# Patient Record
Sex: FEMALE | Race: BLACK OR AFRICAN AMERICAN | NOT HISPANIC OR LATINO
[De-identification: names, ages, dates, MRNs, and addresses within clinical notes are randomized per-mention and may not be internally consistent; named-entity substitution may affect disease eponyms.]

---

## 2021-09-02 PROBLEM — Z00.00 ENCOUNTER FOR PREVENTIVE HEALTH EXAMINATION: Status: ACTIVE | Noted: 2021-09-02

## 2021-09-21 ENCOUNTER — APPOINTMENT (OUTPATIENT)
Dept: OTOLARYNGOLOGY | Facility: CLINIC | Age: 61
End: 2021-09-21

## 2021-10-12 ENCOUNTER — APPOINTMENT (OUTPATIENT)
Dept: OTOLARYNGOLOGY | Facility: CLINIC | Age: 61
End: 2021-10-12

## 2022-01-25 ENCOUNTER — APPOINTMENT (OUTPATIENT)
Dept: OTOLARYNGOLOGY | Facility: CLINIC | Age: 62
End: 2022-01-25
Payer: COMMERCIAL

## 2022-01-25 VITALS
HEART RATE: 94 BPM | DIASTOLIC BLOOD PRESSURE: 72 MMHG | BODY MASS INDEX: 25.4 KG/M2 | WEIGHT: 138 LBS | SYSTOLIC BLOOD PRESSURE: 113 MMHG | HEIGHT: 62 IN | OXYGEN SATURATION: 96 %

## 2022-01-25 DIAGNOSIS — Z86.59 PERSONAL HISTORY OF OTHER MENTAL AND BEHAVIORAL DISORDERS: ICD-10-CM

## 2022-01-25 DIAGNOSIS — Z78.9 OTHER SPECIFIED HEALTH STATUS: ICD-10-CM

## 2022-01-25 PROCEDURE — 99243 OFF/OP CNSLTJ NEW/EST LOW 30: CPT

## 2022-01-25 RX ORDER — ESCITALOPRAM OXALATE 5 MG/1
TABLET, FILM COATED ORAL
Refills: 0 | Status: ACTIVE | COMMUNITY

## 2022-01-25 RX ORDER — TRAZODONE HYDROCHLORIDE 300 MG/1
TABLET ORAL
Refills: 0 | Status: ACTIVE | COMMUNITY

## 2022-01-25 RX ORDER — RIZATRIPTAN BENZOATE 10 MG/1
TABLET ORAL
Refills: 0 | Status: ACTIVE | COMMUNITY

## 2022-01-25 RX ORDER — LAMOTRIGINE 25 MG/1
TABLET ORAL
Refills: 0 | Status: ACTIVE | COMMUNITY

## 2022-01-25 RX ORDER — METFORMIN HYDROCHLORIDE 625 MG/1
TABLET ORAL
Refills: 0 | Status: ACTIVE | COMMUNITY

## 2022-01-25 RX ORDER — ERGOCALCIFEROL 1.25 MG/1
1.25 MG CAPSULE ORAL
Refills: 0 | Status: ACTIVE | COMMUNITY

## 2022-01-25 NOTE — HISTORY OF PRESENT ILLNESS
[None] : No associated symptoms are reported. [de-identified] : Ms. Barajas is a 60 yo female who is being referred by Dr. Boogie (Endocrinologist) for surgical evaluation on parathyroid gland. She reports during visit with Dr. Boogie for pre-diabetes calcium levels were noted to be elevated. \par 9/18/2021 .4 ViD 85.8. Elevated PTH and calcium since 2018 calcium 10.9 PTH 64. On prescription VitD not on calcium supplements \par 3/12/2021 calcium Ionized 1.43 total calcium 10.5 \par 8/30/2021 NM parathyroid scan showing abnormal parathyroid tissue at the right thyroid lobe\par 6/24/2021 sonogram \par Has not had bone density.  24 hr urine test done last years and told to have elevated calcium level in the urine. \par 2/2021 kidney stones treated with lithotripsy .  No bone pain, bone fracture, or constipation. Feels foggy and has had issues with memory, however, she also takes medications for bipolar disorder that can also make her feel foggy.  \par Denies pain, dysphagia, dysphonia and or dyspnea \par Denies history of smoking. Occasional alcohol intake. \par Denies family history with elevated calcium or PTH levels\par Received booster Covid 19 vacc on 12/28/2021\par \par no FH.  co poor memory, poor energy\par \par pt first learned of hypercalcemia several years ago.

## 2022-01-25 NOTE — CONSULT LETTER
[Consult Letter:] : I had the pleasure of evaluating your patient, [unfilled]. [Please see my note below.] : Please see my note below. [Consult Closing:] : Thank you very much for allowing me to participate in the care of this patient.  If you have any questions, please do not hesitate to contact me. [Sincerely,] : Sincerely, [FreeTextEntry2] : Holger Boogie MD ( [place], [place]) [FreeTextEntry3] : Piotr Cueto MD, FACS\par \par    [facility] [place]\par Associate Chair\par    Department of Otolaryngology\par \par Professor\par Otolaryngology & Molecular Medicine\par [employer] [facility] [employer]\par

## 2022-02-05 ENCOUNTER — APPOINTMENT (OUTPATIENT)
Dept: CT IMAGING | Facility: IMAGING CENTER | Age: 62
End: 2022-02-05

## 2022-02-05 ENCOUNTER — APPOINTMENT (OUTPATIENT)
Dept: ULTRASOUND IMAGING | Facility: IMAGING CENTER | Age: 62
End: 2022-02-05

## 2022-02-11 ENCOUNTER — APPOINTMENT (OUTPATIENT)
Dept: ULTRASOUND IMAGING | Facility: IMAGING CENTER | Age: 62
End: 2022-02-11

## 2022-02-11 ENCOUNTER — OUTPATIENT (OUTPATIENT)
Dept: OUTPATIENT SERVICES | Facility: HOSPITAL | Age: 62
LOS: 1 days | End: 2022-02-11
Payer: COMMERCIAL

## 2022-02-11 ENCOUNTER — APPOINTMENT (OUTPATIENT)
Dept: RADIOLOGY | Facility: IMAGING CENTER | Age: 62
End: 2022-02-11
Payer: COMMERCIAL

## 2022-02-11 ENCOUNTER — RESULT REVIEW (OUTPATIENT)
Age: 62
End: 2022-02-11

## 2022-02-11 ENCOUNTER — APPOINTMENT (OUTPATIENT)
Dept: CT IMAGING | Facility: IMAGING CENTER | Age: 62
End: 2022-02-11

## 2022-02-11 DIAGNOSIS — E21.0 PRIMARY HYPERPARATHYROIDISM: ICD-10-CM

## 2022-02-11 PROCEDURE — 76536 US EXAM OF HEAD AND NECK: CPT | Mod: 26

## 2022-02-11 PROCEDURE — 70492 CT SFT TSUE NCK W/O & W/DYE: CPT

## 2022-02-11 PROCEDURE — 77080 DXA BONE DENSITY AXIAL: CPT | Mod: 26

## 2022-02-11 PROCEDURE — 77080 DXA BONE DENSITY AXIAL: CPT

## 2022-02-11 PROCEDURE — 76536 US EXAM OF HEAD AND NECK: CPT

## 2022-02-11 PROCEDURE — 70491 CT SOFT TISSUE NECK W/DYE: CPT | Mod: 26

## 2022-02-15 ENCOUNTER — NON-APPOINTMENT (OUTPATIENT)
Age: 62
End: 2022-02-15

## 2022-03-22 ENCOUNTER — NON-APPOINTMENT (OUTPATIENT)
Age: 62
End: 2022-03-22

## 2022-04-05 ENCOUNTER — OUTPATIENT (OUTPATIENT)
Dept: OUTPATIENT SERVICES | Facility: HOSPITAL | Age: 62
LOS: 1 days | End: 2022-04-05
Payer: COMMERCIAL

## 2022-04-05 VITALS
HEART RATE: 88 BPM | TEMPERATURE: 99 F | DIASTOLIC BLOOD PRESSURE: 70 MMHG | OXYGEN SATURATION: 97 % | HEIGHT: 61 IN | WEIGHT: 138.01 LBS | RESPIRATION RATE: 16 BRPM | SYSTOLIC BLOOD PRESSURE: 100 MMHG

## 2022-04-05 DIAGNOSIS — Z98.890 OTHER SPECIFIED POSTPROCEDURAL STATES: Chronic | ICD-10-CM

## 2022-04-05 DIAGNOSIS — E21.0 PRIMARY HYPERPARATHYROIDISM: ICD-10-CM

## 2022-04-05 DIAGNOSIS — E11.9 TYPE 2 DIABETES MELLITUS WITHOUT COMPLICATIONS: ICD-10-CM

## 2022-04-05 DIAGNOSIS — F31.9 BIPOLAR DISORDER, UNSPECIFIED: ICD-10-CM

## 2022-04-05 LAB
A1C WITH ESTIMATED AVERAGE GLUCOSE RESULT: 5.1 % — SIGNIFICANT CHANGE UP (ref 4–5.6)
ANION GAP SERPL CALC-SCNC: 12 MMOL/L — SIGNIFICANT CHANGE UP (ref 7–14)
BUN SERPL-MCNC: 23 MG/DL — SIGNIFICANT CHANGE UP (ref 7–23)
CALCIUM SERPL-MCNC: 11.1 MG/DL — HIGH (ref 8.4–10.5)
CHLORIDE SERPL-SCNC: 106 MMOL/L — SIGNIFICANT CHANGE UP (ref 98–107)
CO2 SERPL-SCNC: 24 MMOL/L — SIGNIFICANT CHANGE UP (ref 22–31)
CREAT SERPL-MCNC: 1.1 MG/DL — SIGNIFICANT CHANGE UP (ref 0.5–1.3)
EGFR: 58 ML/MIN/1.73M2 — LOW
ESTIMATED AVERAGE GLUCOSE: 100 — SIGNIFICANT CHANGE UP
GLUCOSE SERPL-MCNC: 103 MG/DL — HIGH (ref 70–99)
HCT VFR BLD CALC: 40 % — SIGNIFICANT CHANGE UP (ref 34.5–45)
HGB BLD-MCNC: 12.8 G/DL — SIGNIFICANT CHANGE UP (ref 11.5–15.5)
MCHC RBC-ENTMCNC: 30.8 PG — SIGNIFICANT CHANGE UP (ref 27–34)
MCHC RBC-ENTMCNC: 32 GM/DL — SIGNIFICANT CHANGE UP (ref 32–36)
MCV RBC AUTO: 96.2 FL — SIGNIFICANT CHANGE UP (ref 80–100)
NRBC # BLD: 0 /100 WBCS — SIGNIFICANT CHANGE UP
NRBC # FLD: 0 K/UL — SIGNIFICANT CHANGE UP
PLATELET # BLD AUTO: 355 K/UL — SIGNIFICANT CHANGE UP (ref 150–400)
POTASSIUM SERPL-MCNC: 4.5 MMOL/L — SIGNIFICANT CHANGE UP (ref 3.5–5.3)
POTASSIUM SERPL-SCNC: 4.5 MMOL/L — SIGNIFICANT CHANGE UP (ref 3.5–5.3)
RBC # BLD: 4.16 M/UL — SIGNIFICANT CHANGE UP (ref 3.8–5.2)
RBC # FLD: 13.7 % — SIGNIFICANT CHANGE UP (ref 10.3–14.5)
SODIUM SERPL-SCNC: 142 MMOL/L — SIGNIFICANT CHANGE UP (ref 135–145)
WBC # BLD: 7.01 K/UL — SIGNIFICANT CHANGE UP (ref 3.8–10.5)
WBC # FLD AUTO: 7.01 K/UL — SIGNIFICANT CHANGE UP (ref 3.8–10.5)

## 2022-04-05 PROCEDURE — 93010 ELECTROCARDIOGRAM REPORT: CPT

## 2022-04-05 NOTE — H&P PST ADULT - HISTORY OF PRESENT ILLNESS
61 year old female with PMH of DM2, Kidney stones, Hypercalcemia, Bipolar disorder, presents to PST, with pre op diagnosis of Primary Hyperparathyroidism for pre op evaluation prior to scheduled surgery- Parathyroidectomy with Parathyroid hormone assay with Dr Cueto.

## 2022-04-05 NOTE — H&P PST ADULT - NEGATIVE ENMT SYMPTOMS
no hearing difficulty/no ear pain/no tinnitus/no vertigo/no sinus symptoms/no nose bleeds/no recurrent cold sores

## 2022-04-05 NOTE — H&P PST ADULT - PROBLEM SELECTOR PLAN 1
Pt is tentatively scheduled for Parathyroidectomy with Parathyroid hormone assay with Dr Cueto on 04/15/22.    Pre-op instructions provided. Pt given verbal and written instructions with teach back on chlorhexidine wash and pepcid. Pt verbalized understanding with return demonstration.    Pt strongly advised to follow up with surgeon to discuss COVID testing requirements prior to procedure.

## 2022-04-05 NOTE — H&P PST ADULT - NSICDXPASTMEDICALHX_GEN_ALL_CORE_FT
PAST MEDICAL HISTORY:  Bipolar disorder     Depression     Kidney stones     Panic attack     Type 2 diabetes mellitus

## 2022-04-05 NOTE — H&P PST ADULT - ASSESSMENT
pre op diagnosis of Primary Hyperparathyroidism for pre op evaluation prior to scheduled surgery- Parathyroidectomy with Parathyroid hormone assay with Dr Cueto.

## 2022-04-05 NOTE — H&P PST ADULT - PROBLEM SELECTOR PLAN 2
Patient instructed to hold Glucophage the night before and the morning of procedure. Pt stated understanding.  Check fingerstick DOS

## 2022-04-05 NOTE — H&P PST ADULT - NSICDXFAMILYHX_GEN_ALL_CORE_FT
FAMILY HISTORY:  Mother  Still living? Yes, Estimated age: Age Unknown  FH: type 2 diabetes, Age at diagnosis: Age Unknown

## 2022-04-14 ENCOUNTER — TRANSCRIPTION ENCOUNTER (OUTPATIENT)
Age: 62
End: 2022-04-14

## 2022-04-14 NOTE — ASU PATIENT PROFILE, ADULT - FALL HARM RISK - UNIVERSAL INTERVENTIONS
Bed in lowest position, wheels locked, appropriate side rails in place/Call bell, personal items and telephone in reach/Instruct patient to call for assistance before getting out of bed or chair/Non-slip footwear when patient is out of bed/[place] to call system/Physically safe environment - no spills, clutter or unnecessary equipment/Purposeful Proactive Rounding/Room/bathroom lighting operational, light cord in reach

## 2022-04-15 ENCOUNTER — APPOINTMENT (OUTPATIENT)
Dept: OTOLARYNGOLOGY | Facility: HOSPITAL | Age: 62
End: 2022-04-15

## 2022-04-15 ENCOUNTER — TRANSCRIPTION ENCOUNTER (OUTPATIENT)
Age: 62
End: 2022-04-15

## 2022-04-15 ENCOUNTER — OUTPATIENT (OUTPATIENT)
Dept: OUTPATIENT SERVICES | Facility: HOSPITAL | Age: 62
LOS: 1 days | Discharge: HOME | End: 2022-04-15
Payer: COMMERCIAL

## 2022-04-15 ENCOUNTER — RESULT REVIEW (OUTPATIENT)
Age: 62
End: 2022-04-15

## 2022-04-15 VITALS
HEART RATE: 75 BPM | TEMPERATURE: 98 F | RESPIRATION RATE: 15 BRPM | DIASTOLIC BLOOD PRESSURE: 79 MMHG | OXYGEN SATURATION: 99 % | WEIGHT: 138.01 LBS | SYSTOLIC BLOOD PRESSURE: 122 MMHG | HEIGHT: 61 IN

## 2022-04-15 VITALS
SYSTOLIC BLOOD PRESSURE: 110 MMHG | RESPIRATION RATE: 16 BRPM | DIASTOLIC BLOOD PRESSURE: 67 MMHG | TEMPERATURE: 98 F | OXYGEN SATURATION: 100 %

## 2022-04-15 DIAGNOSIS — E21.0 PRIMARY HYPERPARATHYROIDISM: ICD-10-CM

## 2022-04-15 DIAGNOSIS — Z98.890 OTHER SPECIFIED POSTPROCEDURAL STATES: Chronic | ICD-10-CM

## 2022-04-15 LAB
GLUCOSE BLDC GLUCOMTR-MCNC: 106 MG/DL — HIGH (ref 70–99)
PTH INTACT, INTRAOP SPECIMEN 2: SIGNIFICANT CHANGE UP
PTH INTACT, INTRAOP SPECIMEN 3: SIGNIFICANT CHANGE UP
PTH INTACT, INTRAOP TIMING 2: SIGNIFICANT CHANGE UP
PTH INTACT, INTRAOP TIMING 3: SIGNIFICANT CHANGE UP
PTH INTACT, INTRAOPERATIVE 2: 112 PG/ML — HIGH (ref 15–65)
PTH INTACT, INTRAOPERATIVE 3: 26 PG/ML — SIGNIFICANT CHANGE UP (ref 15–65)
PTH-INTACT IO % DIF SERPL: 99 PG/ML — HIGH (ref 15–65)

## 2022-04-15 PROCEDURE — 88331 PATH CONSLTJ SURG 1 BLK 1SPC: CPT | Mod: 26

## 2022-04-15 PROCEDURE — 60500 EXPLORE PARATHYROID GLANDS: CPT | Mod: GC

## 2022-04-15 PROCEDURE — 88305 TISSUE EXAM BY PATHOLOGIST: CPT | Mod: 26

## 2022-04-15 DEVICE — CARTRIDGE MICROCLIP 30: Type: IMPLANTABLE DEVICE | Status: FUNCTIONAL

## 2022-04-15 RX ORDER — ONDANSETRON 8 MG/1
4 TABLET, FILM COATED ORAL EVERY 6 HOURS
Refills: 0 | Status: DISCONTINUED | OUTPATIENT
Start: 2022-04-15 | End: 2022-04-29

## 2022-04-15 RX ORDER — OXYCODONE HYDROCHLORIDE 5 MG/1
5 TABLET ORAL EVERY 6 HOURS
Refills: 0 | Status: DISCONTINUED | OUTPATIENT
Start: 2022-04-15 | End: 2022-04-15

## 2022-04-15 RX ORDER — OXYCODONE AND ACETAMINOPHEN 5; 325 MG/1; MG/1
1 TABLET ORAL
Qty: 8 | Refills: 0
Start: 2022-04-15 | End: 2022-04-16

## 2022-04-15 RX ORDER — ACETAMINOPHEN 500 MG
650 TABLET ORAL EVERY 6 HOURS
Refills: 0 | Status: DISCONTINUED | OUTPATIENT
Start: 2022-04-15 | End: 2022-04-29

## 2022-04-15 RX ORDER — OXYCODONE HYDROCHLORIDE 5 MG/1
7.5 TABLET ORAL EVERY 6 HOURS
Refills: 0 | Status: DISCONTINUED | OUTPATIENT
Start: 2022-04-15 | End: 2022-04-15

## 2022-04-15 RX ORDER — SODIUM CHLORIDE 9 MG/ML
1000 INJECTION, SOLUTION INTRAVENOUS
Refills: 0 | Status: DISCONTINUED | OUTPATIENT
Start: 2022-04-15 | End: 2022-04-29

## 2022-04-15 NOTE — ASU DISCHARGE PLAN (ADULT/PEDIATRIC) - NS MD DC FALL RISK RISK
For information on Fall & Injury Prevention, visit: [email or web address] OR  [email or web address] OR  https://www.cdc.gov/steadi/patient.html

## 2022-04-15 NOTE — ASU DISCHARGE PLAN (ADULT/PEDIATRIC) - CARE PROVIDER_API CALL
Piotr Cueto)  [facility]; Otolaryngology  [address] [place] [id number]  Phone: [phone]  Fax: [phone]  Follow Up Time:

## 2022-04-15 NOTE — ASU DISCHARGE PLAN (ADULT/PEDIATRIC) - ASU DC SPECIAL INSTRUCTIONSFT
Please call you surgeon if you experience any numbness or tingling around your mouth, in your fingers or toes, or anywhere. This may be a sign of low blood calcium levels. If you have muscle cramping and or curling of your fingers or toes, this could be even more seriously low blood calcium levels. This can be a life-threatening problem and you must seek medical attention immediately. Take 2 tablets of calcium (TUMS antacid) when this happens. You should not drive if you are having these symptoms.

## 2022-04-15 NOTE — PACU DISCHARGE NOTE - NSPTMEETSDISCHCRITERIADT_GEN_A_CORE
----- Message from Patrick Feliz MD sent at 3/8/2021  9:12 AM CST -----  Regarding: FW: After-Visit Question  Contact: [phone]  It sounds like the answer is yes, but I have not seen the patient since last year, and I did not see him during his recent admission.  If he was seen by another cardiologist in our practice prior, he should follow-up with that doctor.  If not, he should be seen by the heart failure clinic and/or Dr. Harrison.  It sounds like the patient needs to be seen soon.  ----- Message -----  From: Janki Rogers RN  Sent: 3/8/2021   7:41 AM CST  To: Patrick Feliz MD  Subject: FW: After-Visit Question                           ----- Message -----  From: Reji NOLASCO Olya  Sent: 3/7/2021  10:08 AM CST  To: , #  Subject: After-Visit Question                             Dr Feliz, I am writing to tell that I have been released from hospital and I have been told to discontinue the diuretic, bumetanide, that I have been taking prior to my admission. Since coming home I have gained 3.5 more pounds and have a pitting edema ~2+.. Could the discontinuance of this drug be causing the swelling.? Please advise       15-Apr-2022 12:19

## 2022-04-18 PROBLEM — N20.0 CALCULUS OF KIDNEY: Chronic | Status: ACTIVE | Noted: 2022-04-05

## 2022-04-18 PROBLEM — F31.9 BIPOLAR DISORDER, UNSPECIFIED: Chronic | Status: ACTIVE | Noted: 2022-04-05

## 2022-04-18 PROBLEM — E11.9 TYPE 2 DIABETES MELLITUS WITHOUT COMPLICATIONS: Chronic | Status: ACTIVE | Noted: 2022-04-05

## 2022-04-20 LAB — SURGICAL PATHOLOGY STUDY: SIGNIFICANT CHANGE UP

## 2022-04-21 ENCOUNTER — APPOINTMENT (OUTPATIENT)
Dept: OTOLARYNGOLOGY | Facility: CLINIC | Age: 62
End: 2022-04-21
Payer: COMMERCIAL

## 2022-04-21 ENCOUNTER — APPOINTMENT (OUTPATIENT)
Dept: OTOLARYNGOLOGY | Facility: CLINIC | Age: 62
End: 2022-04-21

## 2022-04-21 PROCEDURE — 99024 POSTOP FOLLOW-UP VISIT: CPT

## 2022-04-21 NOTE — REASON FOR VISIT
[Post-Operative Visit] : a post-operative visit [FreeTextEntry2] : s/p Neck exploration and right inferior pole parathyroidectomy on 4/30/2022

## 2022-04-21 NOTE — HISTORY OF PRESENT ILLNESS
[None] : No associated symptoms are reported. [de-identified] : Ms. Barajas is s/p neck exploration and right inferior pole parathyroidectomy on 4/15/2022 for hyperparathyroidism. \par Presents today for incision assessment and suture removal. Reports feeling well. Denies fever,  pain, dysphagia, dysphonia and or dyspnea .  Pt biochem. cured at time surgery after single gland removal

## 2022-04-21 NOTE — CONSULT LETTER
[Dear  ___] : Dear  [unfilled], [Courtesy Letter:] : I had the pleasure of seeing your patient, [unfilled], in my office today. [Please see my note below.] : Please see my note below. [Sincerely,] : Sincerely, [FreeTextEntry2] : Holger Boogie MD ( [place], [place])   [FreeTextEntry3] : Kassy Wilson NP\par Piotr Cueto MD, FACS\par [facility]\par [address]\[place] [address]\par Tel: [phone]\par \par [facility]\par Associate Chair\par Department of Otolaryngology\par Professor of Otolaryngology & Molecular Medicine\par [facility] [employer]\par \par

## 2022-06-29 ENCOUNTER — APPOINTMENT (OUTPATIENT)
Dept: OTOLARYNGOLOGY | Facility: CLINIC | Age: 62
End: 2022-06-29
Payer: COMMERCIAL

## 2022-06-29 DIAGNOSIS — E21.0 PRIMARY HYPERPARATHYROIDISM: ICD-10-CM

## 2022-06-29 PROCEDURE — 99024 POSTOP FOLLOW-UP VISIT: CPT

## 2022-06-29 NOTE — CONSULT LETTER
[Dear  ___] : Dear  [unfilled], [Courtesy Letter:] : I had the pleasure of seeing your patient, [unfilled], in my office today. [Please see my note below.] : Please see my note below. [Sincerely,] : Sincerely, [FreeTextEntry2] : Holger Boogie MD ( [place], [place]) \par  [FreeTextEntry3] : Piotr Cueto MD, FACS\par \par    [facility] [place]\par Associate Chair\par    Department of Otolaryngology\par \par Professor\par Otolaryngology & Molecular Medicine\par [facility] [employer]\par

## 2022-06-29 NOTE — HISTORY OF PRESENT ILLNESS
[None] : No associated symptoms are reported. [de-identified] : Ms. Barajas is s/p neck exploration and right inferior pole parathyroidectomy on 4/15/2022 for hyperparathyroidism.  Pt biochem. cured at time surgery after single gland removal. Presents today for post op follow up. Reports feeling well. Denies pain, dysphagia, dysphonia and or dyspnea. Will be following up with Dr. Boogie in a couples of weeks.

## 2023-08-03 ENCOUNTER — EMERGENCY (EMERGENCY)
Facility: HOSPITAL | Age: 63
LOS: 1 days | Discharge: ANOTHER HOSPITAL | End: 2023-08-03
Attending: EMERGENCY MEDICINE | Admitting: EMERGENCY MEDICINE
Payer: COMMERCIAL

## 2023-08-03 DIAGNOSIS — Z98.890 OTHER SPECIFIED POSTPROCEDURAL STATES: Chronic | ICD-10-CM

## 2023-08-03 PROCEDURE — 99285 EMERGENCY DEPT VISIT HI MDM: CPT

## 2023-08-04 ENCOUNTER — INPATIENT (INPATIENT)
Facility: HOSPITAL | Age: 63
LOS: 2 days | Discharge: HOME | DRG: 552 | End: 2023-08-07
Attending: SURGERY | Admitting: SURGERY
Payer: COMMERCIAL

## 2023-08-04 VITALS
WEIGHT: 154.32 LBS | DIASTOLIC BLOOD PRESSURE: 78 MMHG | OXYGEN SATURATION: 95 % | RESPIRATION RATE: 18 BRPM | HEIGHT: 62 IN | TEMPERATURE: 98 F | HEART RATE: 84 BPM | SYSTOLIC BLOOD PRESSURE: 116 MMHG

## 2023-08-04 VITALS
HEART RATE: 98 BPM | WEIGHT: 138.01 LBS | DIASTOLIC BLOOD PRESSURE: 100 MMHG | OXYGEN SATURATION: 98 % | RESPIRATION RATE: 18 BRPM | TEMPERATURE: 98 F | HEIGHT: 62 IN | SYSTOLIC BLOOD PRESSURE: 160 MMHG

## 2023-08-04 VITALS
SYSTOLIC BLOOD PRESSURE: 127 MMHG | DIASTOLIC BLOOD PRESSURE: 80 MMHG | HEART RATE: 83 BPM | TEMPERATURE: 98 F | RESPIRATION RATE: 16 BRPM | OXYGEN SATURATION: 95 %

## 2023-08-04 DIAGNOSIS — S22.041A: ICD-10-CM

## 2023-08-04 DIAGNOSIS — Z98.890 OTHER SPECIFIED POSTPROCEDURAL STATES: Chronic | ICD-10-CM

## 2023-08-04 LAB
ALBUMIN SERPL ELPH-MCNC: 3.1 G/DL — LOW (ref 3.3–5)
ALP SERPL-CCNC: 83 U/L — SIGNIFICANT CHANGE UP (ref 30–120)
ALT FLD-CCNC: 20 U/L DA — SIGNIFICANT CHANGE UP (ref 10–60)
AMPHET UR-MCNC: NEGATIVE — SIGNIFICANT CHANGE UP
ANION GAP SERPL CALC-SCNC: 6 MMOL/L — SIGNIFICANT CHANGE UP (ref 5–17)
APAP SERPL-MCNC: <1 UG/ML — LOW (ref 10–30)
APPEARANCE UR: ABNORMAL
AST SERPL-CCNC: 29 U/L — SIGNIFICANT CHANGE UP (ref 10–40)
BARBITURATES UR SCN-MCNC: NEGATIVE — SIGNIFICANT CHANGE UP
BASOPHILS # BLD AUTO: 0.03 K/UL — SIGNIFICANT CHANGE UP (ref 0–0.2)
BASOPHILS NFR BLD AUTO: 0.3 % — SIGNIFICANT CHANGE UP (ref 0–2)
BENZODIAZ UR-MCNC: NEGATIVE — SIGNIFICANT CHANGE UP
BILIRUB SERPL-MCNC: 0.4 MG/DL — SIGNIFICANT CHANGE UP (ref 0.2–1.2)
BILIRUB UR-MCNC: NEGATIVE — SIGNIFICANT CHANGE UP
BUN SERPL-MCNC: 25 MG/DL — HIGH (ref 7–23)
CALCIUM SERPL-MCNC: 9.1 MG/DL — SIGNIFICANT CHANGE UP (ref 8.4–10.5)
CHLORIDE SERPL-SCNC: 105 MMOL/L — SIGNIFICANT CHANGE UP (ref 96–108)
CO2 SERPL-SCNC: 29 MMOL/L — SIGNIFICANT CHANGE UP (ref 22–31)
COCAINE METAB.OTHER UR-MCNC: NEGATIVE — SIGNIFICANT CHANGE UP
COLOR SPEC: YELLOW — SIGNIFICANT CHANGE UP
COMMENT - URINE: SIGNIFICANT CHANGE UP
CREAT SERPL-MCNC: 1.16 MG/DL — SIGNIFICANT CHANGE UP (ref 0.5–1.3)
DIFF PNL FLD: ABNORMAL
EGFR: 53 ML/MIN/1.73M2 — LOW
EOSINOPHIL # BLD AUTO: 0.05 K/UL — SIGNIFICANT CHANGE UP (ref 0–0.5)
EOSINOPHIL NFR BLD AUTO: 0.5 % — SIGNIFICANT CHANGE UP (ref 0–6)
EPI CELLS # UR: PRESENT
ETHANOL SERPL-MCNC: <3 MG/DL — SIGNIFICANT CHANGE UP (ref 0–3)
GLUCOSE BLDC GLUCOMTR-MCNC: 131 MG/DL — HIGH (ref 70–99)
GLUCOSE SERPL-MCNC: 106 MG/DL — HIGH (ref 70–99)
GLUCOSE UR QL: NEGATIVE MG/DL — SIGNIFICANT CHANGE UP
HCT VFR BLD CALC: 35.6 % — SIGNIFICANT CHANGE UP (ref 34.5–45)
HGB BLD-MCNC: 11.1 G/DL — LOW (ref 11.5–15.5)
IMM GRANULOCYTES NFR BLD AUTO: 0.3 % — SIGNIFICANT CHANGE UP (ref 0–0.9)
KETONES UR-MCNC: 15 MG/DL
LEUKOCYTE ESTERASE UR-ACNC: ABNORMAL
LYMPHOCYTES # BLD AUTO: 1.6 K/UL — SIGNIFICANT CHANGE UP (ref 1–3.3)
LYMPHOCYTES # BLD AUTO: 17.3 % — SIGNIFICANT CHANGE UP (ref 13–44)
MCHC RBC-ENTMCNC: 29.8 PG — SIGNIFICANT CHANGE UP (ref 27–34)
MCHC RBC-ENTMCNC: 31.2 GM/DL — LOW (ref 32–36)
MCV RBC AUTO: 95.4 FL — SIGNIFICANT CHANGE UP (ref 80–100)
METHADONE UR-MCNC: NEGATIVE — SIGNIFICANT CHANGE UP
MONOCYTES # BLD AUTO: 0.61 K/UL — SIGNIFICANT CHANGE UP (ref 0–0.9)
MONOCYTES NFR BLD AUTO: 6.6 % — SIGNIFICANT CHANGE UP (ref 2–14)
NEUTROPHILS # BLD AUTO: 6.94 K/UL — SIGNIFICANT CHANGE UP (ref 1.8–7.4)
NEUTROPHILS NFR BLD AUTO: 75 % — SIGNIFICANT CHANGE UP (ref 43–77)
NITRITE UR-MCNC: NEGATIVE — SIGNIFICANT CHANGE UP
NRBC # BLD: 0 /100 WBCS — SIGNIFICANT CHANGE UP (ref 0–0)
OPIATES UR-MCNC: NEGATIVE — SIGNIFICANT CHANGE UP
PCP SPEC-MCNC: SIGNIFICANT CHANGE UP
PCP SPEC-MCNC: SIGNIFICANT CHANGE UP
PCP UR-MCNC: NEGATIVE — SIGNIFICANT CHANGE UP
PH UR: 8 — SIGNIFICANT CHANGE UP (ref 5–8)
PLATELET # BLD AUTO: 257 K/UL — SIGNIFICANT CHANGE UP (ref 150–400)
POTASSIUM SERPL-MCNC: 3.9 MMOL/L — SIGNIFICANT CHANGE UP (ref 3.5–5.3)
POTASSIUM SERPL-SCNC: 3.9 MMOL/L — SIGNIFICANT CHANGE UP (ref 3.5–5.3)
PROT SERPL-MCNC: 7.2 G/DL — SIGNIFICANT CHANGE UP (ref 6–8.3)
PROT UR-MCNC: 30 MG/DL
RBC # BLD: 3.73 M/UL — LOW (ref 3.8–5.2)
RBC # FLD: 13.3 % — SIGNIFICANT CHANGE UP (ref 10.3–14.5)
RBC CASTS # UR COMP ASSIST: 65 /HPF — HIGH (ref 0–4)
SALICYLATES SERPL-MCNC: 1.5 MG/DL — LOW (ref 2.8–20)
SODIUM SERPL-SCNC: 140 MMOL/L — SIGNIFICANT CHANGE UP (ref 135–145)
SP GR SPEC: 1.04 — HIGH (ref 1–1.03)
THC UR QL: NEGATIVE — SIGNIFICANT CHANGE UP
UROBILINOGEN FLD QL: 1 MG/DL — SIGNIFICANT CHANGE UP (ref 0.2–1)
WBC # BLD: 9.26 K/UL — SIGNIFICANT CHANGE UP (ref 3.8–10.5)
WBC # FLD AUTO: 9.26 K/UL — SIGNIFICANT CHANGE UP (ref 3.8–10.5)
WBC UR QL: 14 /HPF — HIGH (ref 0–5)

## 2023-08-04 PROCEDURE — 99285 EMERGENCY DEPT VISIT HI MDM: CPT

## 2023-08-04 PROCEDURE — 70450 CT HEAD/BRAIN W/O DYE: CPT | Mod: 26,MA

## 2023-08-04 PROCEDURE — 72146 MRI CHEST SPINE W/O DYE: CPT | Mod: 26

## 2023-08-04 PROCEDURE — 99221 1ST HOSP IP/OBS SF/LOW 40: CPT

## 2023-08-04 PROCEDURE — 72128 CT CHEST SPINE W/O DYE: CPT | Mod: 26,MA

## 2023-08-04 PROCEDURE — 70486 CT MAXILLOFACIAL W/O DYE: CPT | Mod: 26,MA

## 2023-08-04 PROCEDURE — 71260 CT THORAX DX C+: CPT | Mod: 26,MA

## 2023-08-04 PROCEDURE — 74177 CT ABD & PELVIS W/CONTRAST: CPT | Mod: 26,MA

## 2023-08-04 PROCEDURE — 72125 CT NECK SPINE W/O DYE: CPT | Mod: 26,MA

## 2023-08-04 PROCEDURE — 99053 MED SERV 10PM-8AM 24 HR FAC: CPT

## 2023-08-04 RX ORDER — DEXTROSE 50 % IN WATER 50 %
25 SYRINGE (ML) INTRAVENOUS ONCE
Refills: 0 | Status: DISCONTINUED | OUTPATIENT
Start: 2023-08-04 | End: 2023-08-07

## 2023-08-04 RX ORDER — SODIUM CHLORIDE 9 MG/ML
1000 INJECTION, SOLUTION INTRAVENOUS
Refills: 0 | Status: DISCONTINUED | OUTPATIENT
Start: 2023-08-04 | End: 2023-08-07

## 2023-08-04 RX ORDER — GLUCAGON INJECTION, SOLUTION 0.5 MG/.1ML
1 INJECTION, SOLUTION SUBCUTANEOUS ONCE
Refills: 0 | Status: DISCONTINUED | OUTPATIENT
Start: 2023-08-04 | End: 2023-08-07

## 2023-08-04 RX ORDER — INSULIN LISPRO 100/ML
VIAL (ML) SUBCUTANEOUS
Refills: 0 | Status: DISCONTINUED | OUTPATIENT
Start: 2023-08-04 | End: 2023-08-07

## 2023-08-04 RX ORDER — OXYCODONE HYDROCHLORIDE 5 MG/1
2.5 TABLET ORAL EVERY 4 HOURS
Refills: 0 | Status: DISCONTINUED | OUTPATIENT
Start: 2023-08-04 | End: 2023-08-06

## 2023-08-04 RX ORDER — ACETAMINOPHEN 500 MG
975 TABLET ORAL EVERY 6 HOURS
Refills: 0 | Status: DISCONTINUED | OUTPATIENT
Start: 2023-08-04 | End: 2023-08-07

## 2023-08-04 RX ORDER — INSULIN LISPRO 100/ML
VIAL (ML) SUBCUTANEOUS AT BEDTIME
Refills: 0 | Status: DISCONTINUED | OUTPATIENT
Start: 2023-08-04 | End: 2023-08-07

## 2023-08-04 RX ORDER — TRAZODONE HCL 50 MG
50 TABLET ORAL EVERY 24 HOURS
Refills: 0 | Status: DISCONTINUED | OUTPATIENT
Start: 2023-08-04 | End: 2023-08-07

## 2023-08-04 RX ORDER — CLONAZEPAM 1 MG
1 TABLET ORAL DAILY
Refills: 0 | Status: DISCONTINUED | OUTPATIENT
Start: 2023-08-04 | End: 2023-08-07

## 2023-08-04 RX ORDER — ACETAMINOPHEN 500 MG
975 TABLET ORAL ONCE
Refills: 0 | Status: COMPLETED | OUTPATIENT
Start: 2023-08-04 | End: 2023-08-04

## 2023-08-04 RX ORDER — LAMOTRIGINE 25 MG/1
200 TABLET, ORALLY DISINTEGRATING ORAL EVERY 24 HOURS
Refills: 0 | Status: DISCONTINUED | OUTPATIENT
Start: 2023-08-04 | End: 2023-08-07

## 2023-08-04 RX ORDER — HYDROMORPHONE HYDROCHLORIDE 2 MG/ML
0.5 INJECTION INTRAMUSCULAR; INTRAVENOUS; SUBCUTANEOUS ONCE
Refills: 0 | Status: DISCONTINUED | OUTPATIENT
Start: 2023-08-04 | End: 2023-08-04

## 2023-08-04 RX ORDER — ESCITALOPRAM OXALATE 10 MG/1
1 TABLET, FILM COATED ORAL
Qty: 0 | Refills: 0 | DISCHARGE

## 2023-08-04 RX ORDER — DEXTROSE 50 % IN WATER 50 %
12.5 SYRINGE (ML) INTRAVENOUS ONCE
Refills: 0 | Status: DISCONTINUED | OUTPATIENT
Start: 2023-08-04 | End: 2023-08-07

## 2023-08-04 RX ORDER — METFORMIN HYDROCHLORIDE 850 MG/1
1 TABLET ORAL
Refills: 0 | DISCHARGE

## 2023-08-04 RX ORDER — SEMAGLUTIDE 0.68 MG/ML
0.5 INJECTION, SOLUTION SUBCUTANEOUS
Qty: 0 | Refills: 0 | DISCHARGE

## 2023-08-04 RX ORDER — ERGOCALCIFEROL 1.25 MG/1
1 CAPSULE ORAL
Qty: 0 | Refills: 0 | DISCHARGE

## 2023-08-04 RX ORDER — LIDOCAINE 4 G/100G
1 CREAM TOPICAL ONCE
Refills: 0 | Status: COMPLETED | OUTPATIENT
Start: 2023-08-04 | End: 2023-08-04

## 2023-08-04 RX ORDER — OXYCODONE HYDROCHLORIDE 5 MG/1
5 TABLET ORAL ONCE
Refills: 0 | Status: DISCONTINUED | OUTPATIENT
Start: 2023-08-04 | End: 2023-08-04

## 2023-08-04 RX ORDER — ESCITALOPRAM OXALATE 10 MG/1
10 TABLET, FILM COATED ORAL DAILY
Refills: 0 | Status: DISCONTINUED | OUTPATIENT
Start: 2023-08-04 | End: 2023-08-04

## 2023-08-04 RX ORDER — MORPHINE SULFATE 50 MG/1
4 CAPSULE, EXTENDED RELEASE ORAL ONCE
Refills: 0 | Status: DISCONTINUED | OUTPATIENT
Start: 2023-08-04 | End: 2023-08-04

## 2023-08-04 RX ORDER — TRAZODONE HCL 50 MG
1 TABLET ORAL
Qty: 0 | Refills: 0 | DISCHARGE

## 2023-08-04 RX ORDER — TAMSULOSIN HYDROCHLORIDE 0.4 MG/1
1 CAPSULE ORAL
Qty: 0 | Refills: 0 | DISCHARGE

## 2023-08-04 RX ORDER — ACETAMINOPHEN 500 MG
1000 TABLET ORAL ONCE
Refills: 0 | Status: DISCONTINUED | OUTPATIENT
Start: 2023-08-04 | End: 2023-08-04

## 2023-08-04 RX ORDER — ESCITALOPRAM OXALATE 10 MG/1
1 TABLET, FILM COATED ORAL
Refills: 0 | DISCHARGE

## 2023-08-04 RX ORDER — ENOXAPARIN SODIUM 100 MG/ML
40 INJECTION SUBCUTANEOUS EVERY 24 HOURS
Refills: 0 | Status: DISCONTINUED | OUTPATIENT
Start: 2023-08-04 | End: 2023-08-07

## 2023-08-04 RX ORDER — DEXTROSE 50 % IN WATER 50 %
15 SYRINGE (ML) INTRAVENOUS ONCE
Refills: 0 | Status: DISCONTINUED | OUTPATIENT
Start: 2023-08-04 | End: 2023-08-07

## 2023-08-04 RX ORDER — HALOPERIDOL DECANOATE 100 MG/ML
5 INJECTION INTRAMUSCULAR ONCE
Refills: 0 | Status: COMPLETED | OUTPATIENT
Start: 2023-08-04 | End: 2023-08-04

## 2023-08-04 RX ORDER — METFORMIN HYDROCHLORIDE 850 MG/1
3 TABLET ORAL
Qty: 0 | Refills: 0 | DISCHARGE

## 2023-08-04 RX ADMIN — HYDROMORPHONE HYDROCHLORIDE 0.5 MILLIGRAM(S): 2 INJECTION INTRAMUSCULAR; INTRAVENOUS; SUBCUTANEOUS at 16:30

## 2023-08-04 RX ADMIN — MORPHINE SULFATE 4 MILLIGRAM(S): 50 CAPSULE, EXTENDED RELEASE ORAL at 05:30

## 2023-08-04 RX ADMIN — HALOPERIDOL DECANOATE 5 MILLIGRAM(S): 100 INJECTION INTRAMUSCULAR at 16:30

## 2023-08-04 RX ADMIN — MORPHINE SULFATE 4 MILLIGRAM(S): 50 CAPSULE, EXTENDED RELEASE ORAL at 05:13

## 2023-08-04 RX ADMIN — Medication 975 MILLIGRAM(S): at 08:43

## 2023-08-04 RX ADMIN — OXYCODONE HYDROCHLORIDE 5 MILLIGRAM(S): 5 TABLET ORAL at 22:23

## 2023-08-04 RX ADMIN — Medication 1 MILLIGRAM(S): at 23:32

## 2023-08-04 RX ADMIN — OXYCODONE HYDROCHLORIDE 5 MILLIGRAM(S): 5 TABLET ORAL at 21:23

## 2023-08-04 NOTE — H&P ADULT - HISTORY OF PRESENT ILLNESS
Patient is a 63y Female PMH DM and bipolar who presents as a transfer from [place] for trauma evaluation after Mechanical fall. Patient fell while walking her dog on 08/03 in the morning, per sister at bedside she ha probably had  taking too many sleeping pills. Patient complaining of some pain in the left side of her face, chest and back. She states she feels okay and wants to go home. Denies HS/LOC. Denies pain/injury elsewhere. Denies numbness/tingling/paresthesias/weakness. Denies bowel/bladder incontinence. Denies fevers/chills. No other complaints at this time. At baseline, patient ambulates without assistance and has continued to ambulate since the injury. Off note, patient was previously hospitalized 3 weeks ago for suicidal attempted.     In ED: Patient complaining of pain but wants to go home, combative with stuff. Patient tender in left chest and midback. able to mobilize all extremities. Unremarkable labs, CT scan showing T5 burst Fx and left 5th mildly displaced rib fracture.

## 2023-08-04 NOTE — ED ADULT NURSE REASSESSMENT NOTE - NS ED NURSE REASSESS COMMENT FT1
Pt arrived to the ED with no belongings. Pt in hospital gown from [place]. Pt states that her sister took her belongings home before being transferred to [facility] ED. Pt does not have any bags with her at this time. RN confirmed with MD that it is okay to leave patient on blood pressure cuff, cardiac leads and pulse ox for monitoring of vital signs. Cart removed from the room.

## 2023-08-04 NOTE — ED PROVIDER NOTE - ATTENDING CONTRIBUTION TO CARE
attending Souleymane: 63yF h/o DMII, hypercalcemia, bipolar, nephrolithiasis transferred from OSH for fall, found to have fib fx and T5 burst fx after fall while walking her dog yesterday. Pt with recent psychiatric admission for SI with attempt (medication overdose). Denies current SI. Exam as above. Will place on constant observation for psych, review OSH records, spinal precautions, pain control, trauma and spine consults, anticipate admission

## 2023-08-04 NOTE — ED ADULT TRIAGE NOTE - CHIEF COMPLAINT QUOTE
fell while walking dog, abrasion on l face, low back pain, difficulty walking after fall, suicidal thoughts d/c from [facility] 1 week ago, not doing well per sister

## 2023-08-04 NOTE — ED ADULT NURSE NOTE - NSFALLRISKINTERV_ED_ALL_ED

## 2023-08-04 NOTE — ED ADULT TRIAGE NOTE - NSWEIGHTCALCTOOLDRUG_GEN_A_CORE
22 yo, hematemesis, HD yesterday    Relevant Problems   CARDIAC   (+) Arteriovenous fistula, acquired (HCC)   (+) Hypertension      GI   (+) Gastroesophageal reflux disease without esophagitis         (+) Chronic kidney disease (CKD) stage G5/A1, glomerular filtration rate (GFR) less than or equal to 15 mL/min/1.73 square meter and albuminuria creatinine ratio less than 30 mg/g (HCC)   (+) ESRD (end stage renal disease) on dialysis ([facility])       Physical Exam    Airway   Mallampati: II  TM distance: >3 FB  Neck ROM: full       Cardiovascular - normal exam  Rhythm: regular  Rate: normal  (-) murmur     Dental - normal exam           Pulmonary - normal exam  Breath sounds clear to auscultation     Abdominal    Neurological - normal exam                 Anesthesia Plan    ASA 3       Plan - general       Airway plan will be natural airway        Induction: intravenous    Postoperative Plan: Postoperative administration of opioids is intended.    Pertinent diagnostic labs and testing reviewed    Informed Consent:    Anesthetic plan and risks discussed with patient.    Use of blood products discussed with: patient whom consented to blood products.           used

## 2023-08-04 NOTE — ED PROVIDER NOTE - PROGRESS NOTE DETAILS
Met with patient and father at bedside.  Patient does appear to be manic.  She has pressured speech and is disorganized somewhat in her responses.  She does seem to respond well to redirection from her father.  Patient is unclear on her psychiatric medications and advises calling her Sister Jessie.  Consult liaison psychiatry consulted for inpatient management.  Will administer IV Haldol at this time as patient is n.p.oAshanti CAPELLAN. Psychiatry has met with patient.  One-to-one can be discontinued.  IV as needed Haldol and Ativan while NPO.  Patient may resume scheduled psychiatric oral medications in the morning. TIMI.

## 2023-08-04 NOTE — BH CONSULTATION LIAISON ASSESSMENT NOTE - NSBHCHARTREVIEWVS_PSY_A_CORE FT
Vital Signs Last 24 Hrs  T(C): 36.8 (04 Aug 2023 06:55), Max: 36.8 (04 Aug 2023 04:50)  T(F): 98.3 (04 Aug 2023 06:55), Max: 98.3 (04 Aug 2023 04:50)  HR: 90 (04 Aug 2023 06:55) (80 - 98)  BP: 121/83 (04 Aug 2023 06:55) (116/78 - 160/100)  BP(mean): --  RR: 18 (04 Aug 2023 06:55) (16 - 18)  SpO2: 97% (04 Aug 2023 06:55) (95% - 98%)    Parameters below as of 04 Aug 2023 06:55  Patient On (Oxygen Delivery Method): nasal cannula  O2 Flow (L/min): 3

## 2023-08-04 NOTE — BH CONSULTATION LIAISON ASSESSMENT NOTE - NSBHCHARTREVIEWLAB_PSY_A_CORE FT
CBC Full  -  ( 04 Aug 2023 01:26 )  WBC Count : 9.26 K/uL  RBC Count : 3.73 M/uL  Hemoglobin : 11.1 g/dL  Hematocrit : 35.6 %  Platelet Count - Automated : 257 K/uL  Mean Cell Volume : 95.4 fl  Mean Cell Hemoglobin : 29.8 pg  Mean Cell Hemoglobin Concentration : 31.2 gm/dL  Auto Neutrophil # : 6.94 K/uL  Auto Lymphocyte # : 1.60 K/uL  Auto Monocyte # : 0.61 K/uL  Auto Eosinophil # : 0.05 K/uL  Auto Basophil # : 0.03 K/uL  Auto Neutrophil % : 75.0 %  Auto Lymphocyte % : 17.3 %  Auto Monocyte % : 6.6 %  Auto Eosinophil % : 0.5 %  Auto Basophil % : 0.3 %

## 2023-08-04 NOTE — ED ADULT TRIAGE NOTE - SOURCE OF INFORMATION
sister/Patient Propranolol Pregnancy And Lactation Text: This medication is Pregnancy Category C and it isn't known if it is safe during pregnancy. It is excreted in breast milk.

## 2023-08-04 NOTE — BH CONSULTATION LIAISON ASSESSMENT NOTE - NSBHCONSFOLLOWNEEDS_PSY_ALL_CORE
Needs further psych safety assessment prior to discharge pending collateral from psychiatrist/Needs further psych safety assessment prior to discharge

## 2023-08-04 NOTE — CONSULT NOTE ADULT - ASSESSMENT
A/P: 63y Female with T5 burst fx w pedicle involvement    Pain control PRN  Recommend MRI of the Tsp to evaluate PLC integrity  SCDs  full plan to follow pending imaging    Neisha De La Torre MD  Orthopaedic Surgery Resident    [facility] [id number]  [facility]        [id number]  [facility]  [id number]/ [phone]   A/P: 63y Female with T5 burst fx w pedicle involvement    Pain control PRN  Recommend MRI of the Tsp to evaluate PLC integrity  Recommend spinal precautions until MRI is done  SCDs  full plan to follow pending imaging    Neisha De La Torre MD  Orthopaedic Surgery Resident    [facility] [id number]  [facility]        [id number]  [facility]  [id number]/ [phone]

## 2023-08-04 NOTE — H&P ADULT - NSHPLABSRESULTS_GEN_ALL_CORE
LABS:                        11.1   9.26  )-----------( 257      ( 04 Aug 2023 01:26 )             35.6     08-04    140  |  105  |  25<H>  ----------------------------<  106<H>  3.9   |  29  |  1.16    Ca    9.1      04 Aug 2023 02:09    TPro  7.2  /  Alb  3.1<L>  /  TBili  0.4  /  DBili  x   /  AST  29  /  ALT  20  /  AlkPhos  83  08-04          Urinalysis Basic - ( 04 Aug 2023 06:43 )    Color: Yellow / Appearance: Cloudy / S.038 / pH: x  Gluc: x / Ketone: 15 mg/dL  / Bili: Negative / Urobili: 1.0 mg/dL   Blood: x / Protein: 30 mg/dL / Nitrite: Negative   Leuk Esterase: Small / RBC: 65 /HPF / WBC 14 /HPF   Sq Epi: x / Non Sq Epi: x / Bacteria: x LABS:                        11.1   9.26  )-----------( 257      ( 04 Aug 2023 01:26 )             35.6     08-04    140  |  105  |  25<H>  ----------------------------<  106<H>  3.9   |  29  |  1.16    Ca    9.1      04 Aug 2023 02:09    TPro  7.2  /  Alb  3.1<L>  /  TBili  0.4  /  DBili  x   /  AST  29  /  ALT  20  /  AlkPhos  83  08-          Urinalysis Basic - ( 04 Aug 2023 06:43 )        Color: Yellow / Appearance: Cloudy / S.038 / pH: x  Gluc: x / Ketone: 15 mg/dL  / Bili: Negative / Urobili: 1.0 mg/dL   Blood: x / Protein: 30 mg/dL / Nitrite: Negative   Leuk Esterase: Small / RBC: 65 /HPF / WBC 14 /HPF   Sq Epi: x / Non Sq Epi: x / Bacteria: x        CT of the Chest, Abdomen and Pelvis    ABDOMEN AND PELVIS:    IMPRESSION:  Limited arterial phase imaging due to extensive motion.    Revisualized burst fracture of T5. Mild superior endplate compression   deformities at T2-T4 may also be acute. Correlation with MRI is   recommended.    Acute mildly displaced fracture of the left fifth rib.  Small bilateral pleural effusions. No pneumothorax.    Indeterminate somewhat irregular 2.1 x 1.6 cm area of enhancement in the   right hepatic lobe, possibly arterial portal shunt. Correlation with   follow-up MRI is recommended.      CT HEAD:  No acute intracranial hemorrhage, mass effect or midline shift.  No CT evidence of acute large territory vascular infarct.  The ventricles and cortical sulci are within normal limits for age.    The paranasal sinuses and mastoid air cells are well aerated.  Small right frontal scalp hematoma. No displaced calvarial fracture.    CT CERVICAL SPINE:  No prevertebral soft tissue swelling.  Grade 1 anterolisthesis C4-C5 and C5-C6.  Vertebral body heights are maintained.  No evidence of cervical spine fracture.    Multilevel degenerative changes resulting in varying degrees of bony   encroachment on the neural foramina.    CT FACE:  There is no evidence of facial bone fracture. Mild right facial soft   tissue swelling.    The orbital walls are intact. The retrobulbar fat is preserved. The   globes, extraocular muscles and optic nerves are symmetric and   unremarkable bilaterally in their noncontrast appearance.    The maxillary sinus walls are intact.    The pterygoid plates are intact. The nasal bones are intact.    The mandible is intact.

## 2023-08-04 NOTE — BH CONSULTATION LIAISON ASSESSMENT NOTE - RISK ASSESSMENT
Risk factors: recent SA, past SI, mood disorder    Protective factors: relationship with family, no firearms accessible, domiciled with family, no current SI    High suicide risk based on recent history. Low violence risk.

## 2023-08-04 NOTE — ED PROVIDER NOTE - ENMT, MLM
Airway patent, Nasal mucosa clear. Mouth with normal mucosa. Throat has no vesicles, no oropharyngeal exudates and uvula is midline. Contusion to left periorbital area

## 2023-08-04 NOTE — H&P ADULT - ASSESSMENT
63y Female PMH DM and bipolar disorder who s/p Mechanical fall, found to have Burst fracture of T5 , left 5th rib mildly displaced fracture, small b/l pleural effusions, no pneumothorax.       PLAN:   -Admit to Trauma Dr Montes   - Spine precautions  - Keep C collar   - Orthopedic recs: MR Spine (scheduled for 9 PM)  - Pain control  - Continue 1 to 1  - Will need morning xray     Discussed with Dr Simon Irizarry PGY2  ACS Team surgery  1510 63y Female PMH DM and bipolar disorder who s/p Mechanical fall, found to have Burst fracture of T5, Grade 1 anterolisthesis C4-C5 and C5-C6,  left 5th rib mildly displaced fracture, small b/l pleural effusions, no pneumothorax.       PLAN:   -Admit to Trauma Dr Montes   - Spine precautions  - Keep C collar   - Orthopedic recs: MR Spine (scheduled for 9 PM)  - Pain control  - Continue 1 to 1  - Will need morning chest Xray     Discussed with Dr Simon Irizarry PGY2  ACS Team surgery  [id number]

## 2023-08-04 NOTE — ED ADULT NURSE REASSESSMENT NOTE - NS ED NURSE REASSESS COMMENT FT1
security performed Wand successfully, required 4 person assist in order to have pt stand. Patient undressed and placed into gown, call bell in hand and side rails up with bed in lowest position for safety. blanket provided. Comfort and safety provided.

## 2023-08-04 NOTE — CONSULT NOTE ADULT - ASSESSMENT
63y Female PMH DM and bipolar disorder who s/p Mechanical fall, found to have Burst fracture of T5 , left 5th rib mildly displaced fracture, small b/l pleural effusions, no pneumothorax.       PLAN:  - Spine precautions  - Keep C collar   - Orthopedic recs: MR Spine  - Pain control  - Continue 1 to 1  - Will need morning xra  - Discussed with Dr Simon Irizarry PGY2  ACS Team surgery  [id number]

## 2023-08-04 NOTE — BH CONSULTATION LIAISON ASSESSMENT NOTE - CURRENT MEDICATION
MEDICATIONS  (STANDING):  acetaminophen     Tablet .. 975 milliGRAM(s) Oral every 6 hours  enoxaparin Injectable 40 milliGRAM(s) SubCutaneous every 24 hours  escitalopram 10 milliGRAM(s) Oral daily  lamoTRIgine 200 milliGRAM(s) Oral every 24 hours  traZODone 50 milliGRAM(s) Oral every 24 hours    MEDICATIONS  (PRN):  clonazePAM  Tablet 1 milliGRAM(s) Oral daily PRN Anxiety  oxyCODONE    IR 2.5 milliGRAM(s) Oral every 4 hours PRN Moderate Pain (4 - 6)  oxyCODONE    IR 5 milliGRAM(s) Oral Once PRN Severe Pain (7 - 10)

## 2023-08-04 NOTE — BH CONSULTATION LIAISON ASSESSMENT NOTE - SUMMARY
The patient is a 63 y.o. F with PMHx of type II DM, hypercalcemia, and nephrolithiasis, and PPHx of Bipolar, depression, and recent suicide attempt via clonazepam overdose with inpatient hospitalization, transferred to [facility] for fall 1 day ago, consulted by psychiatry for manic episode and symptoms. Per patient, yesterday around 8 AM, while taking her dog out, she had a mechanical fall to the left side of her body. Per sister (Jessie), pt fell in the morning and texted her sister messages that did not make sense throughout the day before calling her on the phone that night, hysterically crying. At that point, EMS was called as the pt was expressing back pain and seemed emotionally unstable. Per sister, pt seemed to be having manic symptoms since discharge from [facility] 1.5 weeks ago, and pt seemed emotional on sunday 7/30 and was talking a lot without making sense. Pt denies current SI/HI, admits to suicide attempt 1.5 weeks ago and states "I got a little selfish and took sleeping pills... I was feeling low." Pt admits to drinking socially and denies tobacco, marijuana, opiate, or other illicit drug use. Pt denies auditory or visual hallucinations, delusions, complains of pain. Pt admits to spending too much money shopping at times. Pt states she has been seeing Dr. Hoffmann, psychiatrist for many years and is presently meeting with a therapist, Christa.

## 2023-08-04 NOTE — BH CONSULTATION LIAISON ASSESSMENT NOTE - NSBHATTESTCOMMENTATTENDFT_PSY_A_CORE
The patient is a 63 y.o. F with PMHx of type II DM, hypercalcemia, and nephrolithiasis, and PPHx of Bipolar, depression, and recent suicide attempt via clonazepam overdose with inpatient hospitalization, transferred to [facility] for fall 1 day ago, consulted by psychiatry for manic episode and symptoms. Per patient, yesterday around 8 AM, while taking her dog out, she had a mechanical fall to the left side of her body. Per sister (Jessie), pt fell in the morning and texted her sister messages that did not make sense throughout the day before calling her on the phone that night, hysterically crying. At that point, EMS was called as the pt was expressing back pain and seemed emotionally unstable. Per sister, pt seemed to be having manic symptoms since discharge from [facility] 1.5 weeks ago, and pt seemed emotional on sunday 7/30 and was talking a lot without making sense. Pt denies current SI/HI, admits to suicide attempt 1.5 weeks ago and states "I got a little selfish and took sleeping pills... I was feeling low." Pt admits to drinking socially and denies tobacco, marijuana, opiate, or other illicit drug use. Pt denies auditory or visual hallucinations, delusions, complains of pain. Pt admits to spending too much money shopping at times. Pt states she has been seeing Dr. Hoffmann, psychiatrist for many years and is presently meeting with a therapist, Ernestine. pending collateral from psychatrist, [facility].

## 2023-08-04 NOTE — H&P ADULT - GLASGOW COMA SCALE: BEST MOTOR RESPONSE, MLM
(M6) obeys commands You can access the FollowMyHealth Patient Portal offered by [facility] by registering at the following website: [email or web address]. By joining [facility]’s FollowMyHealth portal, you will also be able to view your health information using other applications (apps) compatible with our system.

## 2023-08-04 NOTE — ED PROVIDER NOTE - CARDIAC, MLM
Normal rate, regular rhythm.  Heart sounds S1, S2.  No murmurs, rubs or gallops. Ecchymosis to left breast

## 2023-08-04 NOTE — ED ADULT NURSE NOTE - OBJECTIVE STATEMENT
63 y.o F PMH diabetes type 2, depression, bipolar disorder presenting to the ED from [facility] as a transfer for further eval s/p mechanical trip and fall. Pt fell around 0830 on 08/03/23 while attempting to walk her dog, pt unable to remember how she landed. Pt endorses hitting the left side of her head. Pt states that she was unable to get up on her own, son came and helped her up after "dragging her inside." Pt arrived at [facility] at 0000 on 08/04/23 for increased pain to the lower back and neck region. No blood thinners, no aspirin. Pt found to have fracture of T5, compression deformities at the T2-T4  and mildly displaced fracture of the left fifth rib. Pt also found to have small bilateral pleural effusions. Pt received 4mg of morphine at [place], pt arrived to [facility] ED still complaining of pain. Pt arrived to the ED with 20G in the LAC, in c-collar. Pt endorsing SI, pt will not tell RN what her plan is. Hx of attempted SI, recent hospitalization for suicide attempt (pt states she took sleeping pills). Denies HI. Pt placed on cardiac monitor. Patient safety maintained, bed is in lowest position, wheels locked, and side rails raised. Patient oriented to call bell, and call bell is within reach.

## 2023-08-04 NOTE — ED PROVIDER NOTE - OBJECTIVE STATEMENT
Wen Larios is a 63 y.o. F PMHx significant for type II DM, hypercalcemia, bipolar dz, depression, nephrolithiasis who presents as a transfer from [facility] for further work up after fall 1 day ago.  Per patient, yesterday around 8 AM, while taking her dog out, she had a mechanical fall to the left side of her body. She denies feeling lightheaded or dizzy prior to fall.  She denies LOC, confusion, after fall, but did require assistance to stand afterwards which is why EMS was called.  Today she complains of pain on the left side of her head, HA, pain at the left of her sternum, pain over her L. shoulder and mid to lower back pain. Otherwise, she denies changes in her vision, SOB, N/T, ABD pain, dysuria, constipation, or other myalgias or arthralgias.    Of note, patient was recently admitted to the hospital after "suicide attempt ".  Upon questioning, patient states "I was being selfish and took extra sleeping pills."  She was admitted for 1.5 weeks and was discharged 3 days ago.  Currently she denies SI or any thoughts of harming herself. Wen Larios is a 63 y.o. F PMHx significant for type II DM, hypercalcemia, bipolar dz, depression, nephrolithiasis who presents as a transfer from [facility] for further work up after fall 1 day ago.  Per patient, yesterday around 8 AM, while taking her dog out, she had a mechanical fall to the left side of her body. She denies feeling lightheaded or dizzy prior to fall.  She denies LOC, confusion, after fall, but did require assistance to stand afterwards which is why EMS was called.  Today she complains of pain on the left side of her head, HA, pain at the left of her sternum, pain over her L. shoulder and mid to lower back pain. Otherwise, she denies changes in her vision, SOB, N/T, ABD pain, dysuria, constipation, or other myalgias or arthralgias.  COntact (Sister Jessie): [phone], [phone], [phone]  Of note, patient was recently admitted to the hospital after "suicide attempt ".  Upon questioning, patient states "I was being selfish and took extra sleeping pills."  She was admitted for 1.5 weeks and was discharged 3 days ago.  Currently she denies SI or any thoughts of harming herself. Wen Larios is a 63 y.o. F PMHx significant for type II DM, hypercalcemia, bipolar dz, depression, nephrolithiasis who presents as a transfer from [facility] for further work up after fall 1 day ago.  Per patient, yesterday around 8 AM, while taking her dog out, she had a mechanical fall to the left side of her body. She denies feeling lightheaded or dizzy prior to fall.  She denies LOC, confusion, after fall, but did require assistance to stand afterwards which is why EMS was called.  Today she complains of pain on the left side of her head, HA, pain at the left of her sternum, pain over her L. shoulder and mid to lower back pain. Otherwise, she denies changes in her vision, SOB, N/T, ABD pain, dysuria, constipation, or other myalgias or arthralgias.  COntact (Sister Jessie): [phone], [phone], [phone]  Additional history obtained at bedside from patient's father: Patient was admitted to [facility] last Thursday after an intentional overdose on her medications.  She had a 72-hour hold and was released.  Father states patient is "off her rocker" currently.  She is not behaving her normal self at the moment.  She has been excessively spending.  He   He believes he she is taking her psych medications and that they are making her groggy and may have contributed to the fall today.  Patient's Sister Jessie knows more about her psychiatric history and medications.  Of note, patient was recently admitted to the hospital after "suicide attempt ".  Upon questioning, patient states "I was being selfish and took extra sleeping pills."  She was admitted for 1.5 weeks and was discharged 3 days ago.  Currently she denies SI or any thoughts of harming herself.

## 2023-08-04 NOTE — ED PROVIDER NOTE - CARE PLAN
1 Principal Discharge DX:	Stable burst fracture of fourth thoracic vertebra  Secondary Diagnosis:	Bipolar disorder

## 2023-08-04 NOTE — CONSULT NOTE ADULT - SUBJECTIVE AND OBJECTIVE BOX
Patient is a 63y Female PMH DM and bipolar who presents as a transfer from [facility] for trauma evaluation after [facility]. One day ago, patient fell while walking her dog, potentially 2/2 taking too many sleeping pills. Endorsing some pain in the face and back but states she is fine and wants to go home. Denies HS/LOC. Denies pain/injury elsewhere. Denies numbness/tingling/paresthesias/weakness. Denies bowel/bladder incontinence. Denies fevers/chills. No other complaints at this time. At baseline, patient ambulates without assistance and has continued to ambulate since the injury.    HEALTH ISSUES - PROBLEM Dx:          MEDICATIONS  (STANDING):      Allergies    cats (Hives)  tomatoes (Rash)  strawberry (Rash)  No Known Drug Allergies    Intolerances        PAST MEDICAL & SURGICAL HISTORY:  Panic attack    Depression    Bipolar disorder    Kidney stones    Type 2 diabetes mellitus    No significant past surgical history    H/O lithotripsy  kidney stones - 2021                              11.1   9.26  )-----------( 257      ( 04 Aug 2023 01:26 )             35.6       04 Aug 2023 02:09    140    |  105    |  25     ----------------------------<  106    3.9     |  29     |  1.16     Ca    9.1        04 Aug 2023 02:09    TPro  7.2    /  Alb  3.1    /  TBili  0.4    /  DBili  x      /  AST  29     /  ALT  20     /  AlkPhos  83     04 Aug 2023 02:09          Urinalysis Basic - ( 04 Aug 2023 06:43 )    Color: Yellow / Appearance: Cloudy / S.038 / pH: x  Gluc: x / Ketone: 15 mg/dL  / Bili: Negative / Urobili: 1.0 mg/dL   Blood: x / Protein: 30 mg/dL / Nitrite: Negative   Leuk Esterase: Small / RBC: 65 /HPF / WBC 14 /HPF   Sq Epi: x / Non Sq Epi: x / Bacteria: x        Vital Signs Last 24 Hrs  T(C): 36.8 (23 @ 06:55), Max: 36.8 (23 @ 04:50)  T(F): 98.3 (23 @ 06:55), Max: 98.3 (23 @ 04:50)  HR: 90 (23 @ 06:55) (80 - 98)  BP: 121/83 (23 @ 06:55) (116/78 - 160/100)  BP(mean): --  RR: 18 (23 @ 06:55) (16 - 18)  SpO2: 97% (23 @ 06:55) (95% - 98%)    Physical Exam:  Gen: NAD  Spine:  Skin intact  No gross deformity  No midline TTP C/L/S spine, TTP over Tspine  No bony step offs  No paraspinal muscle ttp/hypertonicity  Negative Straight leg raise  Negative clonus  Negative babinski  Negative self  + rectal tone  No saddle anesthesia    Motor:                   C5                C6              C7               C8           T1   R            5/5                5/5            5/5             5/5          5/5  L             5/5               5/5             5/5             5/5          5/5                L2             L3             L4               L5            S1  R         5/5           5/5          5/5             5/5           5/5  L          5/5          5/5           5/5             5/5           5/5    Sensory:            C5         C6         C7      C8       T1        (0=absent, 1=impaired, 2=normal, NT=not testable)  R         2            2           2        2         2  L          2            2           2        2         2               L2          L3         L4      L5       S1         (0=absent, 1=impaired, 2=normal, NT=not testable)  R         2            2            2        2        2  L          2            2           2        2         2    Imaging:   < from: CT Thoracic Spine No Cont (23 @ 02:03) >      < end of copied text >

## 2023-08-04 NOTE — ED PROVIDER NOTE - MUSCULOSKELETAL MINIMAL EXAM
Tenderness over the left lateral neck and C-spine, with normal ROM of c-spine.  Tenderness over the left shoulder, however, she displays FROM.  Tenderness over the mid thoracic spine approximately around T5.

## 2023-08-04 NOTE — BH CONSULTATION LIAISON ASSESSMENT NOTE - DESCRIPTION
Pt is , in a relationship with boyfriend, lives in house with son (22 years old). 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

## 2023-08-04 NOTE — BH CONSULTATION LIAISON ASSESSMENT NOTE - NSBHCONSULTRECOMMENDOTHER_PSY_A_CORE FT
Resume medications:  clonazepam 1 mg tablet PO once a day (at bedtime)  trazodone 50 mg tablet PO once a day (at bedtime)  lamotrigine 200 mg tablet PO 1.5 tab once a day (at bedtime)  Lexapro 10 mg tablet PO once a day

## 2023-08-04 NOTE — ED PROVIDER NOTE - SKIN, MLM
Skin normal color for race, warm, dry and intact. No evidence of rash. Ecchymosis to left periorbital area and left breast. Small contusion left shin

## 2023-08-04 NOTE — ED PROVIDER NOTE - CONSTITUTIONAL, MLM
Ischemic Optic Neuropathy OU- Discussed diagnosis in detail with patient- IOP done today 10 OD and 11 OS - Patient reports no having headaches or jaw aches or weight loss/ gain- OCT done previously UTO OD and OS Superior and Inferior NFL thinning with   slight progression - OCT MAC done previously ordereded by Dr. Wilber Cruz shows no changes with the Ischemic Optic Neuropathy shows only changes in ARMD - VF done previously shows OS shows dense inferior arcuate. Almost identical to last VF done in 2017. Stable @ this time. No need to repeat VF in the future. - Optos done today shows stable OU from previous- Continue to monitorARMD - Dry OU - Discussed diagnosis in detail with patient- Recommend continue taking eye vitamins daily such as Preservision- Continue Amsler grid monitoring daily.  Patient is to contact our office if any changes are noted from today- Optos done today shows drusen OU shows - OCT done previously shows Drusen and mottling OU but stable at this time   - Continue to monitor Cataracts OD - Discussed diagnosis in detail with patient- Discussed signs and symptoms of progression- Discussed UV protection- No treatment needed at this time - Continue to monitor NIDDM since 2005- Discussed diagnosis in detail with patient- Stressed importance of good blood sugar control- Recommend no soda- No diabetic retinopathy seen on today's exam- Letter to [employer]- Continue to monitorPseudophakia OS - Discussed diagnosis in detail with patient- S/P YAG PC OS - good central opening - Continue to monitorPresbyopia OU- Discussed diagnosis in detail with patient- NEW glasses RX given today- Continue to monitor; Dr's Notes: OCT 2/17/21 of MACOCT 10/30/18 of nerve VF 8/18/20- last one to be done per TK Optos 9/9/19MR DEFER 9/9/19
- - -

## 2023-08-04 NOTE — H&P ADULT - NSHPPHYSICALEXAM_GEN_ALL_CORE
PHYSICAL EXAM:  General: NAD  HEENT: Normocephalic, atraumatic, EOMI, PEERLA.  Neck: Soft, midline trachea.  Chest: No chest wall tenderness.   Cardiac: S1, S2, RRR  Respiratory: Bilateral breath sounds clear and equal  Abdomen: Soft, non-distended, non-tender; no rebound or guarding  Groin: Normal appearing  Ext: Palpable radial & DP pulses bilaterally   Back: No TTP; no palpable runoff/stepoff/deformity

## 2023-08-04 NOTE — ED ADULT NURSE REASSESSMENT NOTE - NS ED NURSE REASSESS COMMENT FT1
pt was able to remain calm for IV insertion, first IV from [facility] was infiltrated and took some convincing for the pt to have another for med administration. Boyfriend at bedside who provided pt with ease and relaxation.

## 2023-08-04 NOTE — ED ADULT NURSE REASSESSMENT NOTE - NS ED NURSE REASSESS COMMENT FT1
Received patient from [facility], patient at baseline mental status, able to make needs known,  patient agreeable to plan of care, comfort and safety provided. 1:1 initiated for pt's safety.

## 2023-08-04 NOTE — ED PROVIDER NOTE - CLINICAL SUMMARY MEDICAL DECISION MAKING FREE TEXT BOX
Wen Larios is a 63 y.o. F PMHx significant for type II DM, hypercalcemia, bipolar dz, depression, nephrolithiasis who presents as a transfer from [facility] for further work up after fall 1 day ago. Patient is a transfer from service at hospital.  Given HPI Wen Larios is a 63 y.o. F PMHx significant for type II DM, hypercalcemia, bipolar dz, depression, nephrolithiasis who presents as a transfer from [facility] for further work up after fall 1 day ago. Patient is a transfer from service at hospital.  Given HPI multiple CT scans were obtained to r/o occult fracture. Pt will likely be admitted pending results of scans.

## 2023-08-04 NOTE — CONSULT NOTE ADULT - SUBJECTIVE AND OBJECTIVE BOX
GENERAL SURGERY TRAUMA SERVICE - CONSULT NOTE  --------------------------------------------------------------------------------------------    TRAUMA ACTIVATION LEVEL:     MECHANISM OF INJURY:      [] Blunt:     [] Motor vehicle collision       [X] Fall       [] Pedestrian struck	      [] Motorcycle accident      [] Penetrating:     [] Gun shot wound       [] Stab wound    CHIEF COMPLAINT: Patient is a 63y old  Female who presents with a chief complaint of pain in the left side of her body s/p fall.    HPI:Patient is a 63y Female PMH DM and bipolar who presents as a transfer from [place] for trauma evaluation after Mechanical fall. Patient fell while walking her dog on  in the morning, per sister at bedside she ha probably had  taking too many sleeping pills. Patient complaining of some pain in the left side of her face, chest and back. She states she feels okay and wants to go home. Denies HS/LOC. Denies pain/injury elsewhere. Denies numbness/tingling/paresthesias/weakness. Denies bowel/bladder incontinence. Denies fevers/chills. No other complaints at this time. At baseline, patient ambulates without assistance and has continued to ambulate since the injury. Off note, patient was previously hospitalized 3 weeks ago for suicidal attempted.     In ED: Patient complaining of pain but wants to go home, combative with stuff. Patient tender in left chest and midback. able to mobilize all extremities. Unremarkable labs, CT scan showing T5 burst Fx and left 5th mildly displaced rib fracture.        PRIMARY SURVEY:   A - airway intact  B - bilateral breath sounds and good chest rise  C - initial BP  BP: 121/83 (23 @ 06:55) *** , HR HR: 90 (23 @ 06:55) *** , palpable pulses in all extremities  D - GCS 15 on arrival. E 4, V 5, M 6.   Exposure obtained    SECONDARY SURVEY:  General: NAD  HEENT: Normocephalic, atraumatic, small abrasion on left periorbital area.   Neck: C- Collar in palce, pt denies Neck tenderness.   Chest: Left chest wall tenderness.   Cardiac: S1, S2, RRR  Respiratory: Bilateral breath sounds clear and equal   Abdomen: Soft, non-distended, non-tender; no rebound or guarding; no palpable masses   Groin: Normal appearing  Extremities: Palpable radial & DP pulses bilaterally. Motor and sensory grossly intact in all 4 extremities.  Back: No TTP; no palpable runoff/stepoff/deformity    ROS: 10-system review all negative except as noted in HPI.      PAST MEDICAL & SURGICAL HISTORY:  Panic attack      Depression      Bipolar disorder      Kidney stones      Type 2 diabetes mellitus      H/O lithotripsy  kidney stones - 2021        FAMILY HISTORY:  FH: type 2 diabetes (Mother)    : Non-contributory, as reviewed with the patient and family.    SOCIAL HISTORY:  ***    ALLERGIES: cats (Hives)  tomatoes (Rash)  strawberry (Rash)  No Known Drug Allergies      HOME MEDICATIONS:  Home Medications:  clonazePAM 1 mg oral tablet: 1 tab(s) orally once a day (at bedtime) (04 Aug 2023 00:02)  lamoTRIgine 200 mg oral tablet: 1.5 tab(s) orally once a day (at bedtime) (04 Aug 2023 00:02)  Lexapro 10 mg oral tablet: 1 orally once a day (04 Aug 2023 00:02)  MetFORMIN (Eqv-Glumetza) 500 mg oral tablet, extended release: 1 orally once a day (04 Aug 2023 00:02)  traZODone 50 mg oral tablet: 1 tab(s) orally once a day (at bedtime) (04 Aug 2023 00:02)      CURRENT MEDICATIONS  MEDICATIONS:  ---NEURO-  ---CV-  ---PULM-  ---GI/FEN-  ----  ---ID-   ---ENDO-  ---HEME-  ---OTHER-        --------------------------------------------------------------------------------------------    VITALS:   T(C): 36.8 (23 @ 06:55), Max: 36.8 (23 @ 04:50)  HR: 90 (23 @ 06:55) (80 - 98)  BP: 121/83 (23 @ 06:55) (116/78 - 160/100)  RR: 18 (23 @ 06:55) (16 - 18)  SpO2: 97% (23 @ 06:55) (95% - 98%)  CAPILLARY BLOOD GLUCOSE        CAPILLARY BLOOD GLUCOSE          Height (cm): 157.5 (:22)  Weight (kg): 70 (:22)  BMI (kg/m2): 28.2 (:22)  BSA (m2): 1.71 (:22)    PHYSICAL EXAM:  General: NAD  HEENT: Normocephalic, atraumatic, EOMI, PEERLA.  Neck: Soft, midline trachea.  Chest: No chest wall tenderness.   Cardiac: S1, S2, RRR  Respiratory: Bilateral breath sounds clear and equal  Abdomen: Soft, non-distended, non-tender; no rebound or guarding  Groin: Normal appearing  Ext: Palpable radial & DP pulses bilaterally   Back: No TTP; no palpable runoff/stepoff/deformity    --------------------------------------------------------------------------------------------    LABS  CBC ( @ 01:26)                              11.1<L>                         9.26    )----------------(  257        75.0  % Neutrophils, 17.3  % Lymphocytes, ANC: 6.94                                35.6      BMP ( @ 02:09)             140     |  105     |  25<H> 		Ca++ --      Ca 9.1                ---------------------------------( 106<H>		Mg --                 3.9     |  29      |  1.16  			Ph --        LFTs ( @ 02:09)      TPro 7.2 / Alb 3.1<L> / TBili 0.4 / DBili -- / AST 29 / ALT 20 / AlkPhos 83            --------------------------------------------------------------------------------------------    MICROBIOLOGY  Urinalysis ( @ 06:43):     Color: Yellow / Appearance: Cloudy<!> / S.038<H> / pH: 8.0 / Gluc: Negative / Ketones: 15<!> / Bili: Negative / Urobili: 1.0 / Protein :30<!> / Nitrites: Negative / Leuk.Est: Small<!> / RBC: 65<H> / WBC: 14<H> / Sq Epi:  / Non Sq Epi:  / Bacteria    A few Amorphous crystals present      --------------------------------------------------------------------------------------------    IMAGING  --------------------------------------------------------------------------------------------    ASSESSMENT:    PLAN:   GENERAL SURGERY TRAUMA SERVICE - CONSULT NOTE  --------------------------------------------------------------------------------------------    TRAUMA ACTIVATION LEVEL:     MECHANISM OF INJURY:      [] Blunt:     [] Motor vehicle collision       [X] Fall       [] Pedestrian struck	      [] Motorcycle accident      [] Penetrating:     [] Gun shot wound       [] Stab wound    CHIEF COMPLAINT: Patient is a 63y old  Female who presents with a chief complaint of pain in the left side of her body s/p fall.    HPI:Patient is a 63y Female PMH DM and bipolar who presents as a transfer from [place] for trauma evaluation after Mechanical fall. Patient fell while walking her dog on  in the morning, per sister at bedside she ha probably had  taking too many sleeping pills. Patient complaining of some pain in the left side of her face, chest and back. She states she feels okay and wants to go home. Denies HS/LOC. Denies pain/injury elsewhere. Denies numbness/tingling/paresthesias/weakness. Denies bowel/bladder incontinence. Denies fevers/chills. No other complaints at this time. At baseline, patient ambulates without assistance and has continued to ambulate since the injury. Off note, patient was previously hospitalized 3 weeks ago for suicidal attempted.     In ED: Patient complaining of pain but wants to go home, combative with stuff. Patient tender in left chest and midback. able to mobilize all extremities. Unremarkable labs, CT scan showing T5 burst Fx and left 5th mildly displaced rib fracture.        PRIMARY SURVEY:   A - airway intact  B - bilateral breath sounds and good chest rise  C - initial BP  BP: 121/83 (23 @ 06:55)  , HR HR: 90 (23 @ 06:55)  , palpable pulses in all extremities  D - GCS 15 on arrival. E 4, V 5, M 6.   Exposure obtained    SECONDARY SURVEY:  General: NAD  HEENT: Normocephalic, atraumatic, small abrasion on left periorbital area.   Neck: C- Collar in palce, pt denies Neck tenderness.   Chest: Left chest wall tenderness.   Cardiac: S1, S2, RRR  Respiratory: Bilateral breath sounds clear and equal   Abdomen: Soft, non-distended, non-tender; no rebound or guarding; no palpable masses   Groin: Normal appearing  Extremities: Palpable radial & DP pulses bilaterally. Motor and sensory grossly intact in all 4 extremities.  Back: TTP in upper and mid spine; no palpable runoff/stepoff/deformity.     ROS: 10-system review all negative except as noted in HPI.      PAST MEDICAL & SURGICAL HISTORY:  Panic attack      Depression      Bipolar disorder      Kidney stones      Type 2 diabetes mellitus      H/O lithotripsy  kidney stones - 2021        FAMILY HISTORY:  FH: type 2 diabetes (Mother)    : Non-contributory, as reviewed with the patient and family.    SOCIAL HISTORY:      ALLERGIES: cats (Hives)  tomatoes (Rash)  strawberry (Rash)  No Known Drug Allergies      HOME MEDICATIONS:  Home Medications:  clonazePAM 1 mg oral tablet: 1 tab(s) orally once a day (at bedtime) (04 Aug 2023 00:02)  lamoTRIgine 200 mg oral tablet: 1.5 tab(s) orally once a day (at bedtime) (04 Aug 2023 00:02)  Lexapro 10 mg oral tablet: 1 orally once a day (04 Aug 2023 00:02)  MetFORMIN (Eqv-Glumetza) 500 mg oral tablet, extended release: 1 orally once a day (04 Aug 2023 00:02)  traZODone 50 mg oral tablet: 1 tab(s) orally once a day (at bedtime) (04 Aug 2023 00:02)      CURRENT MEDICATIONS  MEDICATIONS:  ---NEURO-  ---CV-  ---PULM-  ---GI/FEN-  ----  ---ID-   ---ENDO-  ---HEME-  ---OTHER-        --------------------------------------------------------------------------------------------    VITALS:   T(C): 36.8 (23 @ 06:55), Max: 36.8 (23 @ 04:50)  HR: 90 (23 @ 06:55) (80 - 98)  BP: 121/83 (23 @ 06:55) (116/78 - 160/100)  RR: 18 (23 @ 06:55) (16 - 18)  SpO2: 97% (23 @ 06:55) (95% - 98%)  CAPILLARY BLOOD GLUCOSE        CAPILLARY BLOOD GLUCOSE          Height (cm): 157.5 (:22)  Weight (kg): 70 (:22)  BMI (kg/m2): 28.2 (:22)  BSA (m2): 1.71 (:22)    PHYSICAL EXAM:  General: NAD  HEENT: Normocephalic, atraumatic, EOMI, PEERLA.  Neck: Soft, midline trachea.  Chest: No chest wall tenderness.   Cardiac: S1, S2, RRR  Respiratory: Bilateral breath sounds clear and equal  Abdomen: Soft, non-distended, non-tender; no rebound or guarding  Groin: Normal appearing  Ext: Palpable radial & DP pulses bilaterally   Back: No TTP; no palpable runoff/stepoff/deformity    --------------------------------------------------------------------------------------------    LABS  CBC ( @ 01:26)                              11.1<L>                         9.26    )----------------(  257        75.0  % Neutrophils, 17.3  % Lymphocytes, ANC: 6.94                                35.6      BMP ( @ 02:09)             140     |  105     |  25<H> 		Ca++ --      Ca 9.1                ---------------------------------( 106<H>		Mg --                 3.9     |  29      |  1.16  			Ph --        LFTs ( @ 02:09)      TPro 7.2 / Alb 3.1<L> / TBili 0.4 / DBili -- / AST 29 / ALT 20 / AlkPhos 83            --------------------------------------------------------------------------------------------    MICROBIOLOGY  Urinalysis ( @ 06:43):     Color: Yellow / Appearance: Cloudy<!> / S.038<H> / pH: 8.0 / Gluc: Negative / Ketones: 15<!> / Bili: Negative / Urobili: 1.0 / Protein :30<!> / Nitrites: Negative / Leuk.Est: Small<!> / RBC: 65<H> / WBC: 14<H> / Sq Epi:  / Non Sq Epi:  / Bacteria    A few Amorphous crystals present      --------------------------------------------------------------------------------------------    IMAGING  CT ABDOMEN AND PELVIS IC   ORDERED BY: SHAHIDA STONE     ACC: [id number] EXAM:  CT CHEST IC   ORDERED BY: SHAHIDA STONE     PROCEDURE DATE:  2023          INTERPRETATION:  CLINICAL INFORMATION: Status post fall.    COMPARISON: Same day thoracic spine CT    CONTRAST/COMPLICATIONS:  IV Contrast: Omnipaque 350 (accession [id number]), IV contrast documented   in unlinked concurrent exam (accession [id number])  90 cc administered   10   cc discarded  Oral Contrast: NONE  Complications: None reported at time of study completion    PROCEDURE:  CT of the Chest, Abdomen and Pelvis was performed.  Imaging was performed through the chest in the arterial phase followed by   imaging of the abdomen and pelvis in the portal venous phase.  Sagittal and coronal reformats were performed.    FINDINGS: Arterial phase imaging is limited by extensive motion artifact.  CHEST:  LUNGS AND LARGE AIRWAYS: Patent central airways. No pulmonary   consolidation. Mild bibasilar dependent atelectasis.  PLEURA: Small bilateral pleural effusions. No pneumothorax.  VESSELS: Within normal limits.  HEART: Heart size is normal. No pericardial effusion.  MEDIASTINUM AND ALEJANDRA: No lymphadenopathy.  CHEST WALL AND LOWER NECK: Punctate right thyroidcalcification.    ABDOMEN AND PELVIS:  LIVER: Indeterminate somewhat irregular 2.1 x 1.6 cm area of   hyperenhancement within the right hepatic lobe on both arterial and   venous phase imaging (11, 33).  BILE DUCTS: Normal caliber.  GALLBLADDER: Within normal limits.  SPLEEN: Within normal limits.  PANCREAS: Within normal limits.  ADRENALS: Within normal limits.  KIDNEYS/URETERS: Bilateral renal cysts, measuring up to 8.1 cm on the   left. 1.5 cm stone in the right renal pelvis with associated mild   hydronephrosis.    BLADDER: Within normal limits.  REPRODUCTIVE ORGANS: Uterus and adnexa within normal limits.    BOWEL: No bowel obstruction. Appendix is normal.  PERITONEUM: No ascites.  VESSELS: Within normal limits.  RETROPERITONEUM/LYMPH NODES: No lymphadenopathy.  ABDOMINAL WALL: Fat-containing umbilical hernia.  BONES: Chronic lateral right ninth rib fracture. Acute mildly displaced   fracture of the left fifth rib at the costovertebral junction (5, 40).   Revisualized burst fracture of T5 with extensive loss of body height and   mild associated osseous retropulsion. Focal exaggerated kyphosis at this   level. Mild superior endplate compression deformities of T2, T3 and T4   may be acute. Degenerative changes. Grade 1 anterolisthesis at L4-L5 and   at L5/S1.    IMPRESSION:  Limited arterial phase imaging due to extensive motion.    Revisualized burst fracture of T5. Mild superior endplate compression   deformities at T2-T4 may also be acute. Correlation with MRI is   recommended.    Acute mildly displaced fracture of the left fifth rib.  Small bilateral pleural effusions. No pneumothorax.    Indeterminate somewhat irregular 2.1 x 1.6 cm area of enhancement in the   right hepatic lobe, possibly arterial portal shunt. Correlation with   follow-up MRI is recommended.    INTERPRETATION:  Clinical indication: Status post fall.    Technique: Contiguous CT axial images of the thoracic spine were obtained   without intravenous contrast. Images were computer reconstructed in the   sagittal and coronal planes.    Comparison: None.    Findings: There is a kyphotic curvature of the upper thoracic spine.   There is a burst fracture of T5 body involving all 3 columns. There is   mild retropulsion (4-130). Multiple fragments are seen anteriorly,   laterally and posteriorly. There is up to 70% height loss compared to the   adjacent vertebral bodies. The fracture line does not extend to the   posterior ligament.There is a minimally displaced fracture of the left   fifth rib near the costovertebral junction (4-127).    There are degenerative change at C5-6 and C6-7.  There is no significant neural foraminal narrowing or central canal   stenosis.      There is no hematoma in the visualized soft tissue. There is no   significant muscle bulk loss or fatty replacement of the paraspinal   muscles.  There is Field muscle bulk loss or fatty replacement of the   paraspinal muscles.    There is a small bilateral pleural effusion more in the right whose   Hounsfield unit is of negative value. Evaluation of the lung parenchyma   is limited due to motion artifact. However there are no definite   laceration or contusion.    Impression:    Burst fracture of T5 with up to 70% height loss and multiple fragments   and mild retropulsion.  Minimally displaced fracture of the left fifth rib at the costovertebral   junction.  Small bilateral pleural effusion of negative Hounsfield value. Its   significance is unknownbut may indicate chylous effusion. Correlate with   clinical findings.      CT MAXILLOFACIAL   ORDERED BY: SHAHIDA STONE     ACC: [id number] EXAM:  CT CERVICAL SPINE   ORDERED BY: SHAHIDA STONE     ACC: [id number] EXAM:  CT BRAIN   ORDERED BY: SHAHIDA STONE     PROCEDURE DATE:  2023          INTERPRETATION:  CT HEAD WITHOUT CONTRAST  CT CERVICAL SPINE WITHOUT CONTRAST  CT MAXILLOFACIAL BONES WITHOUT CONTRAST    HISTORY: fall.    COMPARISON:  None available.    TECHNIQUE:  1. Noncontrast axial CT head was performed from the skull base to vertex.  2. CT scan of the cervical spine was performed without intravenous   contrast. Multiplanar/3-D reformations were made.  3. CT scan of the maxillofacial bones was performed without intravenous   contrast. Multiplanar/3-D reformations were made.    FINDINGS:    CT HEAD:  No acute intracranial hemorrhage, mass effect or midline shift.  No CT evidence of acute large territory vascular infarct.  The ventricles and cortical sulci are within normal limits for age.    The paranasal sinuses and mastoid air cells are well aerated.  Small right frontal scalp hematoma. No displaced calvarial fracture.    CT CERVICAL SPINE:  No prevertebral soft tissue swelling.  Grade 1 anterolisthesis C4-C5 and C5-C6.  Vertebral body heights are maintained.  No evidence of cervical spine fracture.    Multilevel degenerative changes resulting in varying degrees of bony   encroachment on the neural foramina.    CT FACE:  There is no evidence of facial bone fracture. Mild right facial soft   tissue swelling.    The orbital walls are intact. The retrobulbar fat is preserved. The   globes, extraocular muscles and optic nerves are symmetric and   unremarkable bilaterally in their noncontrast appearance.    The maxillary sinus walls are intact.    The pterygoid plates are intact. The nasal bones are intact.    The mandible is intact.

## 2023-08-04 NOTE — ED PROVIDER NOTE - OBJECTIVE STATEMENT
Patient presents to emergency department with her sister.  Patient complains of pain after a fall early in the morning.  Patient states she was letting her dog out and fell.  Complains of pain to her left face and her back.  No report of loss of consciousness.  No neck pain.  Patient's sister states that she has a history of bipolar disease.  Was recently admitted after a suicidal attempt approximately a week and a half ago.  Was admitted for 3 days and discharged.  Sister states that since discharge patient has been difficult to control, acting agitated and manic at all times.  Sister states that this is very different from patient's baseline.  Patient has not expressed renewed thoughts of suicide but has been difficult to manage at home.  Patient is followed by a psychiatrist but the sister feels she needs a new one

## 2023-08-04 NOTE — BH CONSULTATION LIAISON ASSESSMENT NOTE - HPI (INCLUDE ILLNESS QUALITY, SEVERITY, DURATION, TIMING, CONTEXT, MODIFYING FACTORS, ASSOCIATED SIGNS AND SYMPTOMS)
The patient is a 63 y.o. F with PMHx of type II DM, hypercalcemia, and nephrolithiasis, and PPHx of Bipolar, depression, and recent suicide attempt via clonazepam overdose with inpatient hospitalization, transferred to [facility] for fall 1 day ago, consulted by psychiatry for manic episode and symptoms. Per patient, yesterday around 8 AM, while taking her dog out, she had a mechanical fall to the left side of her body. Per sister (Jessie), pt fell in the morning and texted her sister messages that did not make sense throughout the day before calling her on the phone that night, hysterically crying. At that point, EMS was called as the pt was expressing back pain and seemed emotionally unstable. Per sister, pt seemed to be having manic symptoms since discharge from [facility] 1.5 weeks ago, and pt seemed emotional on sunday 7/30 and was talking a lot without making sense. Pt denies current SI/HI, admits to suicide attempt 1.5 weeks ago and states "I got a little selfish and took sleeping pills... I was feeling low." Pt admits to drinking socially and denies tobacco, marijuana, opiate, or other illicit drug use. Pt denies auditory or visual hallucinations, delusions, complains of pain. Pt admits to spending too much money shopping at times. Pt states she has been seeing Dr. Hoffmann, psychiatrist for many years and is presently meeting with a therapist, Ernestine.    Pt was found lying supine on right side, alert and oriented. No pressured speech, flight of ideas, euphoria, or psychomotor agitation noted. Some tangential speech and irritability observed. Patient complains of pain.     Per sister, pt has had suicidal ideation about 1 year ago during which the pt called her sister and family intervened and no emergency services were called. Sister does not believe pt has been taking medications regularly, but states she has been taking the clonazepam for years. Sister states she has not been meeting regularly with psychiatrist and needs a new one.

## 2023-08-04 NOTE — BH CONSULTATION LIAISON ASSESSMENT NOTE - DETAILS
Suicide attempt via overdose on clonazepam on 7/16/23, admitted to [facility] and discharged after 72-hour hold.  Per sister, pt's brother was admitted to inpatient psychiatric unit for behavioral issues/anger

## 2023-08-04 NOTE — ED PROVIDER NOTE - CLINICAL SUMMARY MEDICAL DECISION MAKING FREE TEXT BOX
Patient presents with injuries after a fall.  Family also reports patient is agitated and is behaving in a manic way.  We will get labs, and imaging to rule out bony injury.  Patient will need psych consult if medically cleared. 50-74%

## 2023-08-04 NOTE — ED ADULT NURSE NOTE - NSFALLRISKINTERV_ED_ALL_ED

## 2023-08-05 LAB
A1C WITH ESTIMATED AVERAGE GLUCOSE RESULT: 5.5 % — SIGNIFICANT CHANGE UP (ref 4–5.6)
ANION GAP SERPL CALC-SCNC: 9 MMOL/L — SIGNIFICANT CHANGE UP (ref 5–17)
BUN SERPL-MCNC: 22 MG/DL — SIGNIFICANT CHANGE UP (ref 7–23)
CALCIUM SERPL-MCNC: 8.9 MG/DL — SIGNIFICANT CHANGE UP (ref 8.4–10.5)
CHLORIDE SERPL-SCNC: 106 MMOL/L — SIGNIFICANT CHANGE UP (ref 96–108)
CO2 SERPL-SCNC: 27 MMOL/L — SIGNIFICANT CHANGE UP (ref 22–31)
CREAT SERPL-MCNC: 1.04 MG/DL — SIGNIFICANT CHANGE UP (ref 0.5–1.3)
EGFR: 61 ML/MIN/1.73M2 — SIGNIFICANT CHANGE UP
ESTIMATED AVERAGE GLUCOSE: 111 MG/DL — SIGNIFICANT CHANGE UP (ref 68–114)
GLUCOSE BLDC GLUCOMTR-MCNC: 100 MG/DL — HIGH (ref 70–99)
GLUCOSE BLDC GLUCOMTR-MCNC: 117 MG/DL — HIGH (ref 70–99)
GLUCOSE BLDC GLUCOMTR-MCNC: 135 MG/DL — HIGH (ref 70–99)
GLUCOSE BLDC GLUCOMTR-MCNC: 92 MG/DL — SIGNIFICANT CHANGE UP (ref 70–99)
GLUCOSE SERPL-MCNC: 108 MG/DL — HIGH (ref 70–99)
HCT VFR BLD CALC: 35.6 % — SIGNIFICANT CHANGE UP (ref 34.5–45)
HGB BLD-MCNC: 11.2 G/DL — LOW (ref 11.5–15.5)
MCHC RBC-ENTMCNC: 30.2 PG — SIGNIFICANT CHANGE UP (ref 27–34)
MCHC RBC-ENTMCNC: 31.5 GM/DL — LOW (ref 32–36)
MCV RBC AUTO: 96 FL — SIGNIFICANT CHANGE UP (ref 80–100)
NRBC # BLD: 0 /100 WBCS — SIGNIFICANT CHANGE UP (ref 0–0)
PLATELET # BLD AUTO: 251 K/UL — SIGNIFICANT CHANGE UP (ref 150–400)
POTASSIUM SERPL-MCNC: 3.9 MMOL/L — SIGNIFICANT CHANGE UP (ref 3.5–5.3)
POTASSIUM SERPL-SCNC: 3.9 MMOL/L — SIGNIFICANT CHANGE UP (ref 3.5–5.3)
RBC # BLD: 3.71 M/UL — LOW (ref 3.8–5.2)
RBC # FLD: 13.3 % — SIGNIFICANT CHANGE UP (ref 10.3–14.5)
SODIUM SERPL-SCNC: 142 MMOL/L — SIGNIFICANT CHANGE UP (ref 135–145)
WBC # BLD: 6.62 K/UL — SIGNIFICANT CHANGE UP (ref 3.8–10.5)
WBC # FLD AUTO: 6.62 K/UL — SIGNIFICANT CHANGE UP (ref 3.8–10.5)

## 2023-08-05 PROCEDURE — 99232 SBSQ HOSP IP/OBS MODERATE 35: CPT

## 2023-08-05 PROCEDURE — 71045 X-RAY EXAM CHEST 1 VIEW: CPT | Mod: 26

## 2023-08-05 RX ORDER — OXYCODONE HYDROCHLORIDE 5 MG/1
5 TABLET ORAL EVERY 4 HOURS
Refills: 0 | Status: DISCONTINUED | OUTPATIENT
Start: 2023-08-05 | End: 2023-08-06

## 2023-08-05 RX ORDER — ESCITALOPRAM OXALATE 10 MG/1
10 TABLET, FILM COATED ORAL DAILY
Refills: 0 | Status: DISCONTINUED | OUTPATIENT
Start: 2023-08-05 | End: 2023-08-07

## 2023-08-05 RX ADMIN — Medication 975 MILLIGRAM(S): at 23:17

## 2023-08-05 RX ADMIN — Medication 975 MILLIGRAM(S): at 19:46

## 2023-08-05 RX ADMIN — LAMOTRIGINE 200 MILLIGRAM(S): 25 TABLET, ORALLY DISINTEGRATING ORAL at 12:54

## 2023-08-05 RX ADMIN — OXYCODONE HYDROCHLORIDE 5 MILLIGRAM(S): 5 TABLET ORAL at 19:46

## 2023-08-05 RX ADMIN — OXYCODONE HYDROCHLORIDE 5 MILLIGRAM(S): 5 TABLET ORAL at 18:48

## 2023-08-05 RX ADMIN — Medication 975 MILLIGRAM(S): at 12:54

## 2023-08-05 RX ADMIN — Medication 50 MILLIGRAM(S): at 06:56

## 2023-08-05 RX ADMIN — ESCITALOPRAM OXALATE 10 MILLIGRAM(S): 10 TABLET, FILM COATED ORAL at 14:14

## 2023-08-05 RX ADMIN — OXYCODONE HYDROCHLORIDE 5 MILLIGRAM(S): 5 TABLET ORAL at 05:03

## 2023-08-05 RX ADMIN — Medication 975 MILLIGRAM(S): at 18:45

## 2023-08-05 RX ADMIN — ENOXAPARIN SODIUM 40 MILLIGRAM(S): 100 INJECTION SUBCUTANEOUS at 06:56

## 2023-08-05 RX ADMIN — Medication 975 MILLIGRAM(S): at 13:40

## 2023-08-05 RX ADMIN — OXYCODONE HYDROCHLORIDE 5 MILLIGRAM(S): 5 TABLET ORAL at 23:47

## 2023-08-05 RX ADMIN — Medication 975 MILLIGRAM(S): at 06:56

## 2023-08-05 RX ADMIN — OXYCODONE HYDROCHLORIDE 5 MILLIGRAM(S): 5 TABLET ORAL at 23:17

## 2023-08-05 RX ADMIN — Medication 975 MILLIGRAM(S): at 07:56

## 2023-08-05 RX ADMIN — OXYCODONE HYDROCHLORIDE 5 MILLIGRAM(S): 5 TABLET ORAL at 15:00

## 2023-08-05 RX ADMIN — OXYCODONE HYDROCHLORIDE 5 MILLIGRAM(S): 5 TABLET ORAL at 06:03

## 2023-08-05 RX ADMIN — OXYCODONE HYDROCHLORIDE 5 MILLIGRAM(S): 5 TABLET ORAL at 14:11

## 2023-08-05 NOTE — CHART NOTE - NSCHARTNOTEFT_GEN_A_CORE
Patient seen and examined    Sleeping, but easily arousable  Grossly neurologically intact  Moving arms / legs without complaints of weakness / numbness  Tolerating diet  abdominal exam benign  vitals stable    - Awaiting spine service follow up after MRI done  - Pain seems to be very well controlled  - Will eventually need PT consult pending activity level per spine service  - CXR this AM without worsening ptx / hayes

## 2023-08-05 NOTE — PROGRESS NOTE ADULT - ASSESSMENT
63y Female PMH DM and bipolar disorder who s/p Mechanical fall, found to have Burst fracture of T5, Grade 1 anterolisthesis C4-C5 and C5-C6,  left 5th rib mildly displaced fracture, small b/l pleural effusions, no pneumothorax.     PLAN:  - Spine precautions  - Keep C collar   - Follow up orthopedics recs  - Pain control  - Regular diet     ACS, [id number]

## 2023-08-05 NOTE — PATIENT PROFILE ADULT - FALL HARM RISK - HARM RISK INTERVENTIONS
Assistance with ambulation/Assistance OOB with selected safe patient handling equipment/Communicate Risk of Fall with Harm to all staff/Discuss with provider need for PT consult/Monitor gait and stability/Reinforce activity limits and safety measures with patient and family/Tailored Fall Risk Interventions/Visual Cue: Yellow wristband and red socks/Bed in lowest position, wheels locked, appropriate side rails in place/Call bell, personal items and telephone in reach/Instruct patient to call for assistance before getting out of bed or chair/Non-slip footwear when patient is out of bed/[place] to call system/Physically safe environment - no spills, clutter or unnecessary equipment/Purposeful Proactive Rounding/Room/bathroom lighting operational, light cord in reach

## 2023-08-05 NOTE — CHART NOTE - NSCHARTNOTEFT_GEN_A_CORE
MRI Reviewed w attending. At this time injury appears nonoperative in nature. Pt to follow up in clinic with Dr. Webb. Orthopedics signing off at this time. MRI Reviewed w attending. At this time injury appears nonoperative in nature. Patient does not require spine precautions and can work w PT. Pt to follow up in clinic with Dr. Webb. Orthopedics signing off at this time.

## 2023-08-06 LAB
ANION GAP SERPL CALC-SCNC: 11 MMOL/L — SIGNIFICANT CHANGE UP (ref 5–17)
BUN SERPL-MCNC: 21 MG/DL — SIGNIFICANT CHANGE UP (ref 7–23)
CALCIUM SERPL-MCNC: 9.3 MG/DL — SIGNIFICANT CHANGE UP (ref 8.4–10.5)
CHLORIDE SERPL-SCNC: 105 MMOL/L — SIGNIFICANT CHANGE UP (ref 96–108)
CO2 SERPL-SCNC: 26 MMOL/L — SIGNIFICANT CHANGE UP (ref 22–31)
CREAT SERPL-MCNC: 1 MG/DL — SIGNIFICANT CHANGE UP (ref 0.5–1.3)
EGFR: 64 ML/MIN/1.73M2 — SIGNIFICANT CHANGE UP
GLUCOSE BLDC GLUCOMTR-MCNC: 110 MG/DL — HIGH (ref 70–99)
GLUCOSE BLDC GLUCOMTR-MCNC: 116 MG/DL — HIGH (ref 70–99)
GLUCOSE BLDC GLUCOMTR-MCNC: 140 MG/DL — HIGH (ref 70–99)
GLUCOSE BLDC GLUCOMTR-MCNC: 144 MG/DL — HIGH (ref 70–99)
GLUCOSE SERPL-MCNC: 109 MG/DL — HIGH (ref 70–99)
HCT VFR BLD CALC: 39 % — SIGNIFICANT CHANGE UP (ref 34.5–45)
HGB BLD-MCNC: 12.2 G/DL — SIGNIFICANT CHANGE UP (ref 11.5–15.5)
MAGNESIUM SERPL-MCNC: 2 MG/DL — SIGNIFICANT CHANGE UP (ref 1.6–2.6)
MCHC RBC-ENTMCNC: 30.6 PG — SIGNIFICANT CHANGE UP (ref 27–34)
MCHC RBC-ENTMCNC: 31.3 GM/DL — LOW (ref 32–36)
MCV RBC AUTO: 97.7 FL — SIGNIFICANT CHANGE UP (ref 80–100)
NRBC # BLD: 0 /100 WBCS — SIGNIFICANT CHANGE UP (ref 0–0)
PHOSPHATE SERPL-MCNC: 3.3 MG/DL — SIGNIFICANT CHANGE UP (ref 2.5–4.5)
PLATELET # BLD AUTO: 310 K/UL — SIGNIFICANT CHANGE UP (ref 150–400)
POTASSIUM SERPL-MCNC: 4.4 MMOL/L — SIGNIFICANT CHANGE UP (ref 3.5–5.3)
POTASSIUM SERPL-SCNC: 4.4 MMOL/L — SIGNIFICANT CHANGE UP (ref 3.5–5.3)
RBC # BLD: 3.99 M/UL — SIGNIFICANT CHANGE UP (ref 3.8–5.2)
RBC # FLD: 13.3 % — SIGNIFICANT CHANGE UP (ref 10.3–14.5)
SODIUM SERPL-SCNC: 142 MMOL/L — SIGNIFICANT CHANGE UP (ref 135–145)
WBC # BLD: 6.84 K/UL — SIGNIFICANT CHANGE UP (ref 3.8–10.5)
WBC # FLD AUTO: 6.84 K/UL — SIGNIFICANT CHANGE UP (ref 3.8–10.5)

## 2023-08-06 RX ORDER — OXYCODONE HYDROCHLORIDE 5 MG/1
5 TABLET ORAL EVERY 6 HOURS
Refills: 0 | Status: DISCONTINUED | OUTPATIENT
Start: 2023-08-06 | End: 2023-08-07

## 2023-08-06 RX ORDER — OXYCODONE HYDROCHLORIDE 5 MG/1
10 TABLET ORAL EVERY 6 HOURS
Refills: 0 | Status: DISCONTINUED | OUTPATIENT
Start: 2023-08-06 | End: 2023-08-07

## 2023-08-06 RX ADMIN — Medication 1 MILLIGRAM(S): at 21:20

## 2023-08-06 RX ADMIN — OXYCODONE HYDROCHLORIDE 5 MILLIGRAM(S): 5 TABLET ORAL at 06:07

## 2023-08-06 RX ADMIN — OXYCODONE HYDROCHLORIDE 5 MILLIGRAM(S): 5 TABLET ORAL at 05:37

## 2023-08-06 RX ADMIN — ENOXAPARIN SODIUM 40 MILLIGRAM(S): 100 INJECTION SUBCUTANEOUS at 06:54

## 2023-08-06 RX ADMIN — Medication 975 MILLIGRAM(S): at 17:03

## 2023-08-06 RX ADMIN — Medication 975 MILLIGRAM(S): at 13:00

## 2023-08-06 RX ADMIN — Medication 975 MILLIGRAM(S): at 06:00

## 2023-08-06 RX ADMIN — Medication 975 MILLIGRAM(S): at 00:00

## 2023-08-06 RX ADMIN — OXYCODONE HYDROCHLORIDE 5 MILLIGRAM(S): 5 TABLET ORAL at 11:00

## 2023-08-06 RX ADMIN — OXYCODONE HYDROCHLORIDE 10 MILLIGRAM(S): 5 TABLET ORAL at 23:37

## 2023-08-06 RX ADMIN — Medication 50 MILLIGRAM(S): at 06:55

## 2023-08-06 RX ADMIN — Medication 975 MILLIGRAM(S): at 23:37

## 2023-08-06 RX ADMIN — Medication 975 MILLIGRAM(S): at 05:36

## 2023-08-06 RX ADMIN — OXYCODONE HYDROCHLORIDE 2.5 MILLIGRAM(S): 5 TABLET ORAL at 12:33

## 2023-08-06 RX ADMIN — Medication 975 MILLIGRAM(S): at 12:32

## 2023-08-06 RX ADMIN — LAMOTRIGINE 200 MILLIGRAM(S): 25 TABLET, ORALLY DISINTEGRATING ORAL at 12:31

## 2023-08-06 RX ADMIN — OXYCODONE HYDROCHLORIDE 5 MILLIGRAM(S): 5 TABLET ORAL at 10:17

## 2023-08-06 RX ADMIN — OXYCODONE HYDROCHLORIDE 10 MILLIGRAM(S): 5 TABLET ORAL at 17:02

## 2023-08-06 RX ADMIN — OXYCODONE HYDROCHLORIDE 2.5 MILLIGRAM(S): 5 TABLET ORAL at 13:00

## 2023-08-06 RX ADMIN — ESCITALOPRAM OXALATE 10 MILLIGRAM(S): 10 TABLET, FILM COATED ORAL at 12:32

## 2023-08-06 NOTE — PROGRESS NOTE ADULT - ASSESSMENT
63y Female PMH DM and bipolar disorder who s/p Mechanical fall, found to have Burst fracture of T5, Grade 1 anterolisthesis C4-C5 and C5-C6,  left 5th rib mildly displaced fracture, small b/l pleural effusions, no pneumothorax.     PLAN:  - Spine precautions  - Keep C collar   - Follow up orthopedics recs regarding spinal precautions   - PT/OT   - Pain control  - Regular diet     ACS, [id number]

## 2023-08-07 ENCOUNTER — TRANSCRIPTION ENCOUNTER (OUTPATIENT)
Age: 63
End: 2023-08-07

## 2023-08-07 VITALS
RESPIRATION RATE: 18 BRPM | SYSTOLIC BLOOD PRESSURE: 115 MMHG | TEMPERATURE: 98 F | HEART RATE: 79 BPM | OXYGEN SATURATION: 95 % | DIASTOLIC BLOOD PRESSURE: 74 MMHG

## 2023-08-07 LAB
ANION GAP SERPL CALC-SCNC: 14 MMOL/L — SIGNIFICANT CHANGE UP (ref 5–17)
BUN SERPL-MCNC: 20 MG/DL — SIGNIFICANT CHANGE UP (ref 7–23)
CALCIUM SERPL-MCNC: 9.5 MG/DL — SIGNIFICANT CHANGE UP (ref 8.4–10.5)
CHLORIDE SERPL-SCNC: 104 MMOL/L — SIGNIFICANT CHANGE UP (ref 96–108)
CO2 SERPL-SCNC: 27 MMOL/L — SIGNIFICANT CHANGE UP (ref 22–31)
CREAT SERPL-MCNC: 0.96 MG/DL — SIGNIFICANT CHANGE UP (ref 0.5–1.3)
EGFR: 67 ML/MIN/1.73M2 — SIGNIFICANT CHANGE UP
GLUCOSE BLDC GLUCOMTR-MCNC: 105 MG/DL — HIGH (ref 70–99)
GLUCOSE BLDC GLUCOMTR-MCNC: 107 MG/DL — HIGH (ref 70–99)
GLUCOSE BLDC GLUCOMTR-MCNC: 143 MG/DL — HIGH (ref 70–99)
GLUCOSE SERPL-MCNC: 97 MG/DL — SIGNIFICANT CHANGE UP (ref 70–99)
HCT VFR BLD CALC: 38.1 % — SIGNIFICANT CHANGE UP (ref 34.5–45)
HGB BLD-MCNC: 12 G/DL — SIGNIFICANT CHANGE UP (ref 11.5–15.5)
MAGNESIUM SERPL-MCNC: 2 MG/DL — SIGNIFICANT CHANGE UP (ref 1.6–2.6)
MCHC RBC-ENTMCNC: 30.8 PG — SIGNIFICANT CHANGE UP (ref 27–34)
MCHC RBC-ENTMCNC: 31.5 GM/DL — LOW (ref 32–36)
MCV RBC AUTO: 97.9 FL — SIGNIFICANT CHANGE UP (ref 80–100)
NRBC # BLD: 0 /100 WBCS — SIGNIFICANT CHANGE UP (ref 0–0)
PHOSPHATE SERPL-MCNC: 3.9 MG/DL — SIGNIFICANT CHANGE UP (ref 2.5–4.5)
PLATELET # BLD AUTO: 334 K/UL — SIGNIFICANT CHANGE UP (ref 150–400)
POTASSIUM SERPL-MCNC: 4.3 MMOL/L — SIGNIFICANT CHANGE UP (ref 3.5–5.3)
POTASSIUM SERPL-SCNC: 4.3 MMOL/L — SIGNIFICANT CHANGE UP (ref 3.5–5.3)
RBC # BLD: 3.89 M/UL — SIGNIFICANT CHANGE UP (ref 3.8–5.2)
RBC # FLD: 13.3 % — SIGNIFICANT CHANGE UP (ref 10.3–14.5)
SODIUM SERPL-SCNC: 145 MMOL/L — SIGNIFICANT CHANGE UP (ref 135–145)
WBC # BLD: 6.39 K/UL — SIGNIFICANT CHANGE UP (ref 3.8–10.5)
WBC # FLD AUTO: 6.39 K/UL — SIGNIFICANT CHANGE UP (ref 3.8–10.5)

## 2023-08-07 PROCEDURE — 80048 BASIC METABOLIC PNL TOTAL CA: CPT

## 2023-08-07 PROCEDURE — G1004: CPT

## 2023-08-07 PROCEDURE — 71260 CT THORAX DX C+: CPT | Mod: MA

## 2023-08-07 PROCEDURE — 84100 ASSAY OF PHOSPHORUS: CPT

## 2023-08-07 PROCEDURE — 97165 OT EVAL LOW COMPLEX 30 MIN: CPT

## 2023-08-07 PROCEDURE — 80053 COMPREHEN METABOLIC PANEL: CPT

## 2023-08-07 PROCEDURE — 99285 EMERGENCY DEPT VISIT HI MDM: CPT

## 2023-08-07 PROCEDURE — 72125 CT NECK SPINE W/O DYE: CPT | Mod: MA

## 2023-08-07 PROCEDURE — 99232 SBSQ HOSP IP/OBS MODERATE 35: CPT

## 2023-08-07 PROCEDURE — 70450 CT HEAD/BRAIN W/O DYE: CPT | Mod: MA

## 2023-08-07 PROCEDURE — 99231 SBSQ HOSP IP/OBS SF/LOW 25: CPT

## 2023-08-07 PROCEDURE — 74177 CT ABD & PELVIS W/CONTRAST: CPT | Mod: MA

## 2023-08-07 PROCEDURE — 96374 THER/PROPH/DIAG INJ IV PUSH: CPT

## 2023-08-07 PROCEDURE — 85027 COMPLETE CBC AUTOMATED: CPT

## 2023-08-07 PROCEDURE — G0378: CPT

## 2023-08-07 PROCEDURE — 82962 GLUCOSE BLOOD TEST: CPT

## 2023-08-07 PROCEDURE — 81001 URINALYSIS AUTO W/SCOPE: CPT

## 2023-08-07 PROCEDURE — 97161 PT EVAL LOW COMPLEX 20 MIN: CPT

## 2023-08-07 PROCEDURE — 36415 COLL VENOUS BLD VENIPUNCTURE: CPT

## 2023-08-07 PROCEDURE — 80307 DRUG TEST PRSMV CHEM ANLYZR: CPT

## 2023-08-07 PROCEDURE — 71045 X-RAY EXAM CHEST 1 VIEW: CPT

## 2023-08-07 PROCEDURE — 96375 TX/PRO/DX INJ NEW DRUG ADDON: CPT

## 2023-08-07 PROCEDURE — 83735 ASSAY OF MAGNESIUM: CPT

## 2023-08-07 PROCEDURE — 70486 CT MAXILLOFACIAL W/O DYE: CPT | Mod: MA

## 2023-08-07 PROCEDURE — 72128 CT CHEST SPINE W/O DYE: CPT | Mod: MA

## 2023-08-07 PROCEDURE — 85025 COMPLETE CBC W/AUTO DIFF WBC: CPT

## 2023-08-07 PROCEDURE — 83036 HEMOGLOBIN GLYCOSYLATED A1C: CPT

## 2023-08-07 PROCEDURE — 72146 MRI CHEST SPINE W/O DYE: CPT | Mod: MF

## 2023-08-07 RX ORDER — OXYCODONE HYDROCHLORIDE 5 MG/1
1 TABLET ORAL
Qty: 8 | Refills: 0
Start: 2023-08-07

## 2023-08-07 RX ORDER — KETOROLAC TROMETHAMINE 30 MG/ML
15 SYRINGE (ML) INJECTION EVERY 6 HOURS
Refills: 0 | Status: DISCONTINUED | OUTPATIENT
Start: 2023-08-07 | End: 2023-08-07

## 2023-08-07 RX ORDER — ACETAMINOPHEN 500 MG
3 TABLET ORAL
Qty: 0 | Refills: 0 | DISCHARGE
Start: 2023-08-07

## 2023-08-07 RX ORDER — CLONAZEPAM 1 MG
1 TABLET ORAL
Qty: 0 | Refills: 0 | DISCHARGE
Start: 2023-08-07

## 2023-08-07 RX ADMIN — Medication 975 MILLIGRAM(S): at 00:37

## 2023-08-07 RX ADMIN — Medication 15 MILLIGRAM(S): at 11:25

## 2023-08-07 RX ADMIN — OXYCODONE HYDROCHLORIDE 10 MILLIGRAM(S): 5 TABLET ORAL at 07:01

## 2023-08-07 RX ADMIN — OXYCODONE HYDROCHLORIDE 10 MILLIGRAM(S): 5 TABLET ORAL at 13:43

## 2023-08-07 RX ADMIN — Medication 975 MILLIGRAM(S): at 06:01

## 2023-08-07 RX ADMIN — LAMOTRIGINE 200 MILLIGRAM(S): 25 TABLET, ORALLY DISINTEGRATING ORAL at 13:40

## 2023-08-07 RX ADMIN — Medication 50 MILLIGRAM(S): at 06:01

## 2023-08-07 RX ADMIN — OXYCODONE HYDROCHLORIDE 10 MILLIGRAM(S): 5 TABLET ORAL at 14:30

## 2023-08-07 RX ADMIN — Medication 975 MILLIGRAM(S): at 16:56

## 2023-08-07 RX ADMIN — ESCITALOPRAM OXALATE 10 MILLIGRAM(S): 10 TABLET, FILM COATED ORAL at 11:24

## 2023-08-07 RX ADMIN — Medication 975 MILLIGRAM(S): at 12:00

## 2023-08-07 RX ADMIN — Medication 975 MILLIGRAM(S): at 11:24

## 2023-08-07 RX ADMIN — Medication 975 MILLIGRAM(S): at 07:01

## 2023-08-07 RX ADMIN — Medication 15 MILLIGRAM(S): at 12:00

## 2023-08-07 RX ADMIN — OXYCODONE HYDROCHLORIDE 10 MILLIGRAM(S): 5 TABLET ORAL at 00:37

## 2023-08-07 RX ADMIN — OXYCODONE HYDROCHLORIDE 10 MILLIGRAM(S): 5 TABLET ORAL at 06:01

## 2023-08-07 RX ADMIN — ENOXAPARIN SODIUM 40 MILLIGRAM(S): 100 INJECTION SUBCUTANEOUS at 06:01

## 2023-08-07 NOTE — BH CONSULTATION LIAISON PROGRESS NOTE - NSBHCHARTREVIEWVS_PSY_A_CORE FT
Vital Signs Last 24 Hrs  T(C): 36.7 (07 Aug 2023 13:01), Max: 36.9 (06 Aug 2023 21:17)  T(F): 98 (07 Aug 2023 13:01), Max: 98.4 (06 Aug 2023 21:17)  HR: 75 (07 Aug 2023 13:01) (66 - 81)  BP: 110/72 (07 Aug 2023 13:01) (98/65 - 118/74)  BP(mean): --  RR: 18 (07 Aug 2023 13:01) (18 - 18)  SpO2: 100% (07 Aug 2023 13:01) (92% - 100%)    Parameters below as of 07 Aug 2023 13:01  Patient On (Oxygen Delivery Method): room air

## 2023-08-07 NOTE — OCCUPATIONAL THERAPY INITIAL EVALUATION ADULT - SHORT TERM MEMORY, REHAB EVAL
Pt Name: CHARANJIT GREENWOOD    MRN: [id number]      Patient is a 75y Female presenting to the emergency department with a chief complaint of fall   Patient is a 75y old  Female who presents with a chief complaint of fall.  Patient presents c/o left hip and groin pain s/p fall at home.  patient states she slipped causing fall, no LOC, no Ha, no neck or back pain.  Patient seen by ER staff, xrays reveal left femoral neck fx    HEALTH ISSUES - PROBLEM Dx: left femoral neck fx       .      REVIEW OF SYSTEMS      General:	    Skin/Breast:  	  Ophthalmologic:  	  ENMT:	    Respiratory and Thorax:  	  Cardiovascular:	    Gastrointestinal:	    Genitourinary:	    Musculoskeletal:	 left hip pain     Neurological:	    Psychiatric:	    Hematology/Lymphatics:	    Endocrine:	    Allergic/Immunologic:	    ROS is otherwise negative.    PAST MEDICAL & SURGICAL HISTORY:  PAST MEDICAL & SURGICAL HISTORY:  Hypertension  S/P tubal ligation  S/P cholecystectomy  S/P cataract surgery: both eyes  Carotid artery disease: s/p carotid bypass right side      Allergies: Biaxin (Other)  No Known Allergies      Medications: morphine  - Injectable 4 milliGRAM(s) IV Push Once      FAMILY HISTORY:  : non-contributory    Social History: no drug use     Ambulation: Walking independently              PHYSICAL EXAM:    Vital Signs Last 24 Hrs  T(C): 36.5 (18 Mar 2018 21:55), Max: 36.5 (18 Mar 2018 21:55)  T(F): 97.7 (18 Mar 2018 21:55), Max: 97.7 (18 Mar 2018 21:55)  HR: 70 (18 Mar 2018 21:55) (70 - 70)  BP: 158/66 (18 Mar 2018 21:55) (158/66 - 158/66)  BP(mean): --  RR: 17 (18 Mar 2018 21:55) (17 - 17)  SpO2: 93% (18 Mar 2018 21:55) (93% - 93%)  Daily Height in cm: 175.26 (18 Mar 2018 21:53)    Daily     Appearance: Alert, responsive, in no acute distress.    Neurological: Sensation is grossly intact to light touch. 5/5 motor function of all extremities. No focal deficits or weaknesses found.    Skin: no rash on visible skin. Skin is clean, dry and intact. No bleeding. No abrasions. No ulcerations.    Vascular: 2+ distal pulses. Cap refill < 2 sec. No signs of venous insuffiency or stasis. No extremity ulcerations. No cyanosis.    Musculoskeletal:         Left Lower Extremity: positive pain on passive ROM to hip, positive tenderness to hip,  no rotation, no shortening, pulse intact, calf soft NT, Dorsi/plantar flexion intact, no further tenderness to lower extremity       Imaging Studies: left hip xray reveals femoral neck fx    Case discussed with Dr. Zhao    A/P:  Pt is a 75y old Female who presents with a chief complaint of fall found to have left femoral neck fx    PLAN:   * NPO for OR   *Routine daily anticoagulation held for OR  * Medical clearance requested for procedure  * Bed rest Pt Name: CHARANJIT GREENWOOD    MRN: [id number]      Patient is a 75y Female presenting to the emergency department with a chief complaint of fall   Patient is a 75y old  Female who presents with a chief complaint of fall.  Patient presents c/o left hip and groin pain s/p fall at home.  patient states she slipped causing fall, no LOC, no Ha, no neck or back pain.  Patient seen by ER staff, xrays reveal left femoral neck fx    HEALTH ISSUES - PROBLEM Dx: left femoral neck fx       .      REVIEW OF SYSTEMS      General:	    Skin/Breast:  	  Ophthalmologic:  	  ENMT:	    Respiratory and Thorax:  	  Cardiovascular:	    Gastrointestinal:	    Genitourinary:	    Musculoskeletal:	 left hip pain     Neurological:	    Psychiatric:	    Hematology/Lymphatics:	    Endocrine:	    Allergic/Immunologic:	    ROS is otherwise negative.    PAST MEDICAL & SURGICAL HISTORY:  PAST MEDICAL & SURGICAL HISTORY:  Hypertension  S/P tubal ligation  S/P cholecystectomy  S/P cataract surgery: both eyes  Carotid artery disease: s/p carotid bypass right side      Allergies: Biaxin (Other)  No Known Allergies      Medications: morphine  - Injectable 4 milliGRAM(s) IV Push Once      FAMILY HISTORY:  : non-contributory    Social History: no drug use     Ambulation: Walking independently              PHYSICAL EXAM:    Vital Signs Last 24 Hrs  T(C): 36.5 (18 Mar 2018 21:55), Max: 36.5 (18 Mar 2018 21:55)  T(F): 97.7 (18 Mar 2018 21:55), Max: 97.7 (18 Mar 2018 21:55)  HR: 70 (18 Mar 2018 21:55) (70 - 70)  BP: 158/66 (18 Mar 2018 21:55) (158/66 - 158/66)  BP(mean): --  RR: 17 (18 Mar 2018 21:55) (17 - 17)  SpO2: 93% (18 Mar 2018 21:55) (93% - 93%)  Daily Height in cm: 175.26 (18 Mar 2018 21:53)    Daily     Appearance: Alert, responsive, in no acute distress.    Neurological: Sensation is grossly intact to light touch. 5/5 motor function of all extremities. No focal deficits or weaknesses found.    Skin: no rash on visible skin. Skin is clean, dry and intact. No bleeding. No abrasions. No ulcerations.    Vascular: 2+ distal pulses. Cap refill < 2 sec. No signs of venous insuffiency or stasis. No extremity ulcerations. No cyanosis.    Musculoskeletal:         Left Lower Extremity: positive pain on passive ROM to hip, positive tenderness to hip,  no rotation, no shortening, pulse intact, calf soft NT, Dorsi/plantar flexion intact, mild tenderness to posterior knee, pain to ankle on ROM, no swelling or tenderness      Imaging Studies: left hip xray reveals femoral neck fx    Case discussed with Dr. Zhao    A/P:  Pt is a 75y old Female who presents with a chief complaint of fall found to have left femoral neck fx    PLAN:   * NPO for OR   *Routine daily anticoagulation held for OR  * Medical clearance requested for procedure  * Bed rest intact

## 2023-08-07 NOTE — OCCUPATIONAL THERAPY INITIAL EVALUATION ADULT - ADDITIONAL COMMENTS
Pt lives in a private home +5 steps to enter, however will be staying with boyfriend in a ranch style home +1 steps to enter, walk in shower. Pt has no AE/DME, was independent PTA.

## 2023-08-07 NOTE — PHYSICAL THERAPY INITIAL EVALUATION ADULT - NS ASR WT BEARING DETAIL RLE
Poor Poor Poor Poor Poor Poor Poor Poor Poor weight-bearing as tolerated Poor Poor Fair Unable to assess Fair Unable to assess Poor Poor Poor Poor Poor Poor Poor Poor Unable to assess Poor Poor

## 2023-08-07 NOTE — DISCHARGE NOTE PROVIDER - CARE PROVIDER_API CALL
Erich Webb)  Orthopaedic Surgery  [address], [address]  [place] [address]  Phone: [phone]  Fax: [phone]  Follow Up Time: 2 weeks    Warren Montes  Surgery  [address]  [place] [address]  Phone: [phone]  Fax: [phone]  Follow Up Time: 2 weeks

## 2023-08-07 NOTE — PROGRESS NOTE ADULT - ASSESSMENT
63y Female PMH DM and bipolar disorder who s/p Mechanical fall, found to have Burst fracture of T5, Grade 1 anterolisthesis C4-C5 and C5-C6,  left 5th rib mildly displaced fracture, small b/l pleural effusions, no pneumothorax.     PLAN:  - Spine precautions  - Keep C collar   - PT/OT   - Pain control  - Regular diet     ACS, [id number]   63y Female PMH DM and bipolar disorder who s/p Mechanical fall, found to have Burst fracture of T5, Grade 1 anterolisthesis C4-C5 and C5-C6,  left 5th rib mildly displaced fracture, small b/l pleural effusions, no pneumothorax.     PLAN:  - Spine precautions  - C-collar removed  - PT/OT   - Pain control  - Regular diet     ACS, [id number]   63y Female PMH DM and bipolar disorder who s/p Mechanical fall, found to have Burst fracture of T5, Grade 1 anterolisthesis C4-C5 and C5-C6,  left 5th rib mildly displaced fracture, small b/l pleural effusions, no pneumothorax.     PLAN:  - Spine precautions  - C-collar removed  - TLSO brace for comfort   - PT/OT   - Pain control  - Regular diet     ACS, 0450   63y Female PMH DM and bipolar disorder who s/p Mechanical fall, found to have Burst fracture of T5, Grade 1 anterolisthesis C4-C5 and C5-C6,  left 5th rib mildly displaced fracture, small b/l pleural effusions, no pneumothorax.     PLAN:  - Spine precautions  - C-collar removed  - TLSO brace for comfort   - PT/OT   - Pain control  - Regular diet     ACS [id number]

## 2023-08-07 NOTE — OCCUPATIONAL THERAPY INITIAL EVALUATION ADULT - PERTINENT HX OF CURRENT PROBLEM, REHAB EVAL
Patient is a 63y Female PMH DM and bipolar who presents as a transfer from [place] for trauma evaluation after Mechanical fall. Patient fell while walking her dog on 08/03 in the morning, per sister at bedside she ha probably had  taking too many sleeping pills. Patient complaining of some pain in the left side of her face, chest and back. She states she feels okay and wants to go home. Denies HS/LOC. Denies pain/injury elsewhere. Denies numbness/tingling/paresthesias/weakness. Denies bowel/bladder incontinence. Denies fevers/chills. No other complaints at this time. At baseline, patient ambulates without assistance and has continued to ambulate since the injury. Off note, patient was previously hospitalized 3 weeks ago for suicidal attempted.     In ED: Patient complaining of pain but wants to go home, combative with stuff. Patient tender in left chest and midback. able to mobilize all extremities. Unremarkable labs, CT scan showing T5 burst Fx and left 5th mildly displaced rib fracture.

## 2023-08-07 NOTE — PHYSICAL THERAPY INITIAL EVALUATION ADULT - PERTINENT HX OF CURRENT PROBLEM, REHAB EVAL
62 yo F bipolar who presents as a transfer from [place] for trauma evaluation after Mechanical fall. Patient fell while walking her dog on 08/03 in the morning, per sister at bedside she ha probably had  taking too many sleeping pills. Patient complaining of some pain in the left side of her face, chest and back. She states she feels okay and wants to go home. Denies HS/LOC. Denies pain/injury elsewhere. Denies numbness/tingling/paresthesias/weakness. Denies bowel/bladder incontinence. Denies fevers/chills. No other complaints at this time. At baseline, patient ambulates without assistance and has continued to ambulate since the injury. Off note, patient was previously hospitalized 3 weeks ago for suicidal attempted. Found to have Burst fracture of T5, Grade 1 anterolisthesis C4-C5 and C5-C6,  left 5th rib mildly displaced fracture, small b/l pleural effusions, no pneumothorax. TLSO ordered for comfort. No surgical intervention per ortho. MRI Thoracic 8/4: Compression fracture involving the T5 vertebral body. Contrast enhanced MR thoracic spine can be done to evaluate this finding if clinically indicated and if there are no contraindications.

## 2023-08-07 NOTE — DISCHARGE NOTE PROVIDER - PROVIDER TOKENS
2022       Bhanu Aguilar MD  [address]  [facility] [address]  Via Fax: [phone]      Patient: Maxi Yo   YOB: 1967   Date of Visit: 2022       Dear Dr. Aguilar:    Thank you for referring Maxi Yo to me for evaluation. Below are my notes for this visit with him.    If you have questions, please do not hesitate to call me. I look forward to following your patient along with you.      Sincerely,        Karri Poole MD        CC: No Recipients  Karri Poole MD  2022  9:32 AM  Signed  Cardiology Clinic Note: Karri Poole MD, MPH     Referred by:   Primary care physician: Bhanu Aguilar MD     Consultation for Maxi Yo  : 1967      Subjective:   Maxi Yo is a 55 year old man with past medical history significant for CAD s/p CABG with  of RCA, DM, HLD, HTN, who presents today for preoperative evaluation for an upcoming knee surgery.    He is a patient of Dr. Zarate, and is here for preoperative evaluation for an upcoming knee surgery.    He was last seen by Dr. Zarate on 7/15/2022, and was advised against elective knee surgery at the time given his A1c was severely elevated and he had, \"advanced incompletely revascularized multivessel coronary artery disease.\" He was continued on his medications at that time.     Since then, he has been feeling well from a cardiac perspective. He is able to climb multiple flights of stairs without issue, but endorses mild dyspnea upon exertion. He otherwise denies chest pain/heaviness, peripheral edema, orthopnea, PND, rapid weight gain, syncope/near syncope, and palpitations. His A1c has improved since the last visit, and is now 8.5%, 10/28/2022.     Review of Systems   Constitutional: Negative for fatigue and unexpected weight change.   HENT: Negative for nosebleeds.    Respiratory: Negative for cough. +Dyspnea upon exertion  Cardiovascular: Negative for chest pain, palpitations and leg swelling.    Gastrointestinal: Negative for abdominal distention, abdominal pain and blood in stool.   Endocrine: Negative for polyuria.   Genitourinary: Negative for hematuria.   Musculoskeletal: Negative for myalgias.   Skin: Negative for pallor.   Neurological: Negative for syncope and light-headedness.   Hematological: Does not bruise/bleed easily.   Otherwise complete ROS is negative.      Past Medical History:   Diagnosis Date   • Cataract    • Coronary artery disease    • Diabetes mellitus (CMS/HCC)     blood sugar 137- anesthesiologist made aware   • Essential (primary) hypertension    • High cholesterol         Past Surgical History:   Procedure Laterality Date   • Arthrotomy/explore/treat knee joint      pt denies complications with anesthesia   • Cardiac catherization      pt denies complications with anesthesia   • Cardiac surgery      pt denies complications with anesthesia   • Eye surgery          No family history on file.     Social History     Tobacco Use   • Smoking status: Former Smoker     Packs/day: 0.00     Quit date: 2020     Years since quittin.0   • Smokeless tobacco: Never Used   Vaping Use   • Vaping Use: never used   Substance Use Topics   • Alcohol use: Not Currently     Comment: Socially per patient   • Drug use: Never        ALLERGIES:  No Known Allergies  Current Outpatient Medications   Medication Sig   • Icosapent Ethyl 1 g Cap Take 2 g by mouth twice daily after meals.   • ketorolac (ACULAR) 0.5 % ophthalmic solution INSTILL 1 DROP IN RIGHT EYE FOUR TIMES DAILY   • ofloxacin (OCUFLOX) 0.3 % ophthalmic solution INSTILL 1 DROP IN RIGHT EYE FOUR TIMES DAILY   • Ozempic, 0.25 or 0.5 MG/DOSE, 2 MG/1.5ML injection INJECT 0.5 MG INTO THE SKIN WEEKLY   • Levemir FlexTouch 100 UNIT/ML pen-injector 30 Units 2 times daily.   • HumaLOG KwikPen 200 UNIT/ML pen-injector 30 Units 2 times daily.   • metoPROLOL succinate (TOPROL-XL) 100 MG 24 hr tablet Take 1 tablet by mouth 2 times daily.   •  atorvastatin (Lipitor) 80 MG tablet Take 1 tablet by mouth daily.   • Cholecalciferol (Vitamin D3) 1.25 mg (50,000 units) capsule Take 1 capsule by mouth 1 day a week.   • aspirin (ECOTRIN) 81 MG EC tablet Take 81 mg by mouth daily.   • enalapril (VASOTEC) 2.5 MG tablet Take 2.5 mg by mouth daily.   • fenofibrate (TRICOR) 145 MG tablet Take 145 mg by mouth daily.   • clopidogrel (PLAVIX) 75 MG tablet Take 75 mg by mouth daily.   • omega-3 acid ethyl esters (LOVAZA) 1 g capsule Take 2 g by mouth 2 times daily.    • glipiZIDE (GLUCOTROL ER) 10 MG 24 hr tablet Take 10 mg by mouth daily.   • metformin (GLUCOPHAGE) 1000 MG tablet Take 1,000 mg by mouth 2 times daily.   • ranolazine (RANEXA) 500 MG 12 hr tablet Take 500 mg by mouth 2 times daily.     No current facility-administered medications for this visit.        Objective:     Physical Exam:   Visit Vitals  /76 (BP Location: LUE - Left upper extremity, Patient Position: Sitting, Cuff Size: Regular)   Pulse 83   Ht 5' 6\" (1.676 m)   Wt 85.6 kg (188 lb 9.7 oz)   SpO2 98%   BMI 30.44 kg/m²     Wt Readings from Last 4 Encounters:   22 85.6 kg (188 lb 9.7 oz)   22 81.6 kg (180 lb)   22 81.6 kg (180 lb)   07/15/22 85.4 kg (188 lb 4.4 oz)     General: NAD  Eyes:  No arcus; No xanthelasma;  No ptosis; pupils equal in size; no icterus  Neck: No JVD sitting; carotid normal amplitude; normal cervical ROM  Respiratory: Symmetric chest expansion, No crackles, wheezes or dullness  Cardiovascular:  Rhythm regular; Normal S1, S2 split normal; No gallops, rubs or murmurs; No carotid bruits; DP pulses 2+ bilaterally  GI: Not distended, non-tender, BS active; no flank dullness  MSK: Muscle mass as expected for age   Extremities: No edema; no varicose veins  Neurologic:  Alert, no tremor  Mood: Euthymic  Skin: Intact     Labs:    Lab work scanned from 10/28/2022:  A1c: 8.5%  TC: 150  T  HDL: 32  LDL: 91    CHOLESTEROL (mg/dL)   Date Value   10/22/2021 208  (H)     HDL (mg/dL)   Date Value   10/22/2021 31 (L)     CHOL/HDL ((calc))   Date Value   10/22/2021 6.7 (H)     TRIGLYCERIDE (mg/dL)   Date Value   10/22/2021 531 (H)     CALCULATED LDL (mg/dL (calc))   Date Value   10/22/2021     LDL cholesterol not calculated. Triglyceride levels greater than 400 mg/dL invalidate calculated LDL results.        Hemoglobin A1C (% of total Hgb)   Date Value   10/22/2021 >14.0 (H)       WBC (K/mcL)   Date Value   01/15/2021 5.9     RBC (mil/mcL)   Date Value   01/15/2021 5.03     HCT (%)   Date Value   01/15/2021 43.9     HGB (g/dL)   Date Value   01/15/2021 15.0     PLT (K/mcL)   Date Value   01/15/2021 388        Sodium (mmol/L)   Date Value   10/22/2021 136     Potassium (mmol/L)   Date Value   10/22/2021 4.3     Chloride (mmol/L)   Date Value   10/22/2021 100     Glucose (mg/dL)   Date Value   10/22/2021 307 (H)     CALCIUM (mg/dL)   Date Value   10/22/2021 9.5     Carbon Dioxide (mmol/L)   Date Value   10/22/2021 28     BUN (mg/dL)   Date Value   10/22/2021 19     Creatinine (mg/dL)   Date Value   10/22/2021 0.70        AST/SGOT (U/L)   Date Value   10/22/2021 14     ALT/SGPT (U/L)   Date Value   10/22/2021 22     No results found for: GGTP  ALK PHOSPHATASE (U/L)   Date Value   10/22/2021 69     TOTAL BILIRUBIN (mg/dL)   Date Value   10/22/2021 0.6        Imaging:    Regadenoson stress test 12/19/2020:  Study Conclusions     Summary:     1. Myocardial perfusion imaging: Left ventricular size is normal. The TID     ratio is 1.1. There is a small, mild, reversible defect involving the     mid anteroseptal wall(s), consistent with ischemia or postoperative     changes in the distribution of the left anterior descending coronary     artery. LV myocardial perfusion is otherwise normal.  2. Gated SPECT: The left ventricular end-diastolic volume is 91ml. The     left ventricular end-systolic volume is 40ml. The calculated left     ventricular ejection fraction is 56%. The calculated  resting left     ventricular ejection fraction is 58%. The calculated left ventricular     ejection fraction during stress is 56%. LV global systolic function is     normal. There is hypokinesis involving the mid anteroseptal wall of the     left ventricle, consistent with postoperative changes.  3. Rest: Left ventricular ejection fraction is within normal limits by     visual estimate.  4. Stress: Left ventricular ejection fraction is within normal limits by     visual estimate.  5. Stress ECG conclusions: The stress ECG is normal. [facility] scoring:     exercise time of 4min; ; .  6. Baseline ECG: Normal sinus rhythm.  Impressions:  Left ventricular global systolic function is normal.    TTE 12/19/2020:  STUDY CONCLUSIONS  SUMMARY:     1. Left ventricle: The cavity size is at the upper limits of normal. Wall     thickness is normal. The ejection fraction was measured by 3D     assessment. The ejection fraction is 51%.  2. Aortic root: The aortic root is mildly dilated.    Cath/PV 1/15/2021:  Normal LVEDP  Triple vessel coronary artery disease with proximal LAD involvement  Widely patent free LIMA to LAD  Widely patent arterial jump grafts to OM2 and D2     Recommendations:  1. Medical management of CAD  2. Further plans per referring physician    3. If he develops more obvious angina, would consider  intervention to RCA    EKG, personally reviewed 12/9/2022:  Normal sinus rhythm, possible left atrial enlargement, incomplete RBB, nonspecific ST and T wave abnormality    Impression/Report/Plan:  In summary, Mr. Yo is a 55 year old male with past medical history significant for CAD s/p CABG with  of RCA, DM, HLD, HTN, who presents today for preoperative evaluation for an upcoming knee surgery.     #1 CAB s/p CABG  of RCA  He has a  of RCA but the rest of his coronary arteries are revascularized by CABG. He does not have any angina, and there is no need for  PCI at this time as he has no angina. I would  be happy to see him again with any new or worsening angina.  - Continue clopidogrel 75 mg daily (Ok to hold during the perioperative period)  - Continue aspirin 81 mg daily (Must continue uninterrupted)  - Continue metoprolol succinate 100 mg BID  - Continue enalapril 2.5 mg daily    #2 Perioperative Evaluation  #3 Type 2 Diabetes Mellitus, A1c of 8.5%  Patient has no acute cardiac conditions requiring urgent management prior to the planned knee surgery. Specifically, no unstable angina, decompensated heart failure, known severe valvular disease, or severe arrhythmia. His estimated risk for surgical complication is <1% based on NSQIP. No further cardiac testing or optimization or testing would be recommended at this time.  - Diabetes as managed by PCP on Ozempic, insulin, glipizide, and metformin    #4 HLD  As managed by Dr. Zarate, lipid panel with LDL 91 and . I would recommend switching from Fenofibrate to Vascepa for TG management.  - Continue atorvastatin 80 mg  - Stop Fenofibrate  - Start Vascepa 2 g BID with meals  - Repeat lipid panel in 6 weeks    Follow up: with Dr. Zarate in 2 months    Karri Poole MD, MPH  Interventional Cardiology  Please contact via Epic or bookjam    Diagnoses Today:   1. Preoperative cardiovascular examination    2. Pure hypercholesterolemia    3. Coronary artery disease involving native coronary artery of native heart, unspecified whether angina present    4. Postsurgical aortocoronary bypass status    5. Mixed hyperlipidemia    6. Type 2 diabetes mellitus without complication, unspecified whether long term insulin use (CMS/[facility])        Karri BRADLEY, have reviewed and edited the scribe's note and agree with her documentation for this patient encounter.       On 12/9/2022, Ernesto BRADLEY scribed the services personally performed by Karri Poole MD.     PROVIDER:[TOKEN:[[id number]],FOLLOWUP:[2 weeks]],PROVIDER:[TOKEN:[[id number]],FOLLOWUP:[2 weeks]]

## 2023-08-07 NOTE — PHYSICAL THERAPY INITIAL EVALUATION ADULT - ADDITIONAL COMMENTS
pt will be returning to her boyfriend's house, 1 step to enter, first floor set up. Prior to admission, pt was I with all functional mobility and ADLs without AD.

## 2023-08-07 NOTE — BH CONSULTATION LIAISON PROGRESS NOTE - NSBHCHARTREVIEWLAB_PSY_A_CORE FT
12.0   6.39  )-----------( 334      ( 07 Aug 2023 07:26 )             38.1     08-07    145  |  104  |  20  ----------------------------<  97  4.3   |  27  |  0.96    Ca    9.5      07 Aug 2023 07:28  Phos  3.9     08-07  Mg     2.0     08-07

## 2023-08-07 NOTE — DISCHARGE NOTE PROVIDER - HOSPITAL COURSE
63y Female PMH DM and bipolar who presents as a transfer from [place] for trauma evaluation after Mechanical fall. Patient fell while walking her dog on 08/03 in the morning, per sister at bedside she ha probably had  taking too many sleeping pills. Patient complaining of some pain in the left side of her face, chest and back. She states she feels okay and wants to go home. Denies HS/LOC. Denies pain/injury elsewhere. Denies numbness/tingling/paresthesias/weakness. Denies bowel/bladder incontinence. Denies fevers/chills. No other complaints at this time. At baseline, patient ambulates without assistance and has continued to ambulate since the injury. Off note, patient was previously hospitalized 3 weeks ago for suicidal attempted.     In ED: Patient complaining of pain but wants to go home, combative with stuff. Patient tender in left chest and midback. able to mobilize all extremities. Unremarkable labs, CT scan showing T5 burst Fx and left 5th mildly displaced rib fracture. Patient admitted to the trauma surgery team.  Orthopedic surgery was consulted and they recommended pain control as needed, MRI of the T spine.     MRI Reviewed w attending. Compression fracture involving the T5 vertebral body. Contrast enhanced MR thoracic spine can be done to evaluate this finding if clinically indicated and if there are no contraindications. As per Ortho, injury non- operative in nature. Patient does not require spine precautions and can work w PT. Pt to follow up in clinic with Dr. Webb.     Physical therapy and occupational therapy worked with patient and recommended outpatient physical therapy. On day of discharge, the patient was tolerating diet, ambulating with assistant and pain controlled.

## 2023-08-07 NOTE — DISCHARGE NOTE PROVIDER - NSDCMRMEDTOKEN_GEN_ALL_CORE_FT
clonazePAM 1 mg oral tablet: 1 tab(s) orally once a day (at bedtime)  lamoTRIgine 200 mg oral tablet: 1.5 tab(s) orally once a day (at bedtime)  Lexapro 10 mg oral tablet: 1 orally once a day  TLSO Brace: T5 burst fracture  traZODone 50 mg oral tablet: 1 tab(s) orally once a day (at bedtime)   acetaminophen 325 mg oral tablet: 3 tab(s) orally every 6 hours  clonazePAM 1 mg oral tablet: 1 tab(s) orally once a day (at bedtime)  clonazePAM 1 mg oral tablet: 1 tab(s) orally once a day As needed Anxiety  lamoTRIgine 200 mg oral tablet: 1.5 tab(s) orally once a day (at bedtime)  Lexapro 10 mg oral tablet: 1 orally once a day  oxyCODONE 5 mg oral capsule: 1 cap(s) orally every 4 hours as needed for moderate to severe pain MDD: 6  traZODone 50 mg oral tablet: 1 tab(s) orally once a day (at bedtime)   acetaminophen 325 mg oral tablet: 3 tab(s) orally every 6 hours  clonazePAM 1 mg oral tablet: 1 tab(s) orally once a day (at bedtime)  clonazePAM 1 mg oral tablet: 1 tab(s) orally once a day As needed Anxiety  lamoTRIgine 200 mg oral tablet: 1.5 tab(s) orally once a day (at bedtime)  Lexapro 10 mg oral tablet: 1 orally once a day  Outpatient Physical Therapy: rehabilitation  oxyCODONE 5 mg oral capsule: 1 cap(s) orally every 4 hours as needed for moderate to severe pain MDD: 6  traZODone 50 mg oral tablet: 1 tab(s) orally once a day (at bedtime)

## 2023-08-07 NOTE — DISCHARGE NOTE NURSING/CASE MANAGEMENT/SOCIAL WORK - PATIENT PORTAL LINK FT
You can access the FollowMyHealth Patient Portal offered by [facility] by registering at the following website: [email or web address]. By joining [employer]’s FollowMyHealth portal, you will also be able to view your health information using other applications (apps) compatible with our system.

## 2023-08-07 NOTE — BH CONSULTATION LIAISON PROGRESS NOTE - NSBHASSESSMENTFT_PSY_ALL_CORE
Pt found sitting in bed, alert and oriented. Pt says she is in a better mood and is looking forward to her back brace fitting and going home. Pt denies SI/HI. Pt is in a pleasant mood, talkative, with no irritability, decreased sleep, pressured speech, distractibility, or psychomotor agitation noted. Pt kelly lack of energy, appetite, or sleep. Pt says that since she was in the hospital last for SA, son has taken all of her medications out of the house and manages when she takes her medications. Pt looks forward to her 2PM appointment with her mental health counselor Ernestine Kothari, whom she has a phone visit with every other monday. Pt states that prior to Pt states that if she were to have suicidal ideation and feel "low" like she did 2 weeks ago, she would feel comfortable reaching out to sister Jessie or other family members.

## 2023-08-07 NOTE — DISCHARGE NOTE PROVIDER - CARE PROVIDERS DIRECT ADDRESSES
[email or web address]

## 2023-08-07 NOTE — PROGRESS NOTE ADULT - TIME BILLING
I saw and evaluated patient and agree with above note  TLSO brace for comfort  reviewed with patient  disposition planning

## 2023-08-07 NOTE — DISCHARGE NOTE NURSING/CASE MANAGEMENT/SOCIAL WORK - NSDCPEFALRISK_GEN_ALL_CORE
For information on Fall & Injury Prevention, visit: [email or web address] OR  [email or web address] OR  https://www.cdc.gov/steadi/patient.html

## 2023-08-07 NOTE — DISCHARGE NOTE PROVIDER - NSDCCPCAREPLAN_GEN_ALL_CORE_FT
PRINCIPAL DISCHARGE DIAGNOSIS  Diagnosis: Stable burst fracture of fourth thoracic vertebra  Assessment and Plan of Treatment: measured fitted for rigid, TLSO spinal orthosis to aid in treatment of post T5 fracture , for comfort  follow up in clinic with Dr. Webb      SECONDARY DISCHARGE DIAGNOSES  Diagnosis: Bipolar disorder  Assessment and Plan of Treatment:

## 2023-08-07 NOTE — BH CONSULTATION LIAISON PROGRESS NOTE - CURRENT MEDICATION
MEDICATIONS  (STANDING):  acetaminophen     Tablet .. 975 milliGRAM(s) Oral every 6 hours  dextrose 5%. 1000 milliLiter(s) (100 mL/Hr) IV Continuous <Continuous>  dextrose 5%. 1000 milliLiter(s) (50 mL/Hr) IV Continuous <Continuous>  dextrose 50% Injectable 25 Gram(s) IV Push once  dextrose 50% Injectable 12.5 Gram(s) IV Push once  dextrose 50% Injectable 25 Gram(s) IV Push once  enoxaparin Injectable 40 milliGRAM(s) SubCutaneous every 24 hours  escitalopram 10 milliGRAM(s) Oral daily  glucagon  Injectable 1 milliGRAM(s) IntraMuscular once  insulin lispro (ADMELOG) corrective regimen sliding scale   SubCutaneous three times a day before meals  insulin lispro (ADMELOG) corrective regimen sliding scale   SubCutaneous at bedtime  ketorolac   Injectable 15 milliGRAM(s) IV Push every 6 hours  lamoTRIgine 200 milliGRAM(s) Oral every 24 hours  traZODone 50 milliGRAM(s) Oral every 24 hours    MEDICATIONS  (PRN):  clonazePAM  Tablet 1 milliGRAM(s) Oral daily PRN Anxiety  dextrose Oral Gel 15 Gram(s) Oral once PRN Blood Glucose LESS THAN 70 milliGRAM(s)/deciliter  oxyCODONE    IR 5 milliGRAM(s) Oral every 6 hours PRN Moderate Pain (4 - 6)  oxyCODONE    IR 10 milliGRAM(s) Oral every 6 hours PRN Severe Pain (7 - 10)

## 2023-08-07 NOTE — PROGRESS NOTE ADULT - SUBJECTIVE AND OBJECTIVE BOX
Patient 63Y female evaluated custom measured fitted for rigid  TLSO spinal orthosis to aid in treatment of post T5 fracture   post fall trauma ordered by Trauma delivered by  [facility] for OBB use for walking weight bearing  aid in healing pain control improve thoracic spine alignment  delivered by [facility] [phone]  
ACS SURGERY DAILY PROGRESS NOTE:     SUBJECTIVE/ROS: Patient seen and examined. She is resting comfortably. Pain is controlled.       MEDICATIONS  (STANDING):  acetaminophen     Tablet .. 975 milliGRAM(s) Oral every 6 hours  dextrose 5%. 1000 milliLiter(s) (100 mL/Hr) IV Continuous <Continuous>  dextrose 5%. 1000 milliLiter(s) (50 mL/Hr) IV Continuous <Continuous>  dextrose 50% Injectable 25 Gram(s) IV Push once  dextrose 50% Injectable 25 Gram(s) IV Push once  dextrose 50% Injectable 12.5 Gram(s) IV Push once  enoxaparin Injectable 40 milliGRAM(s) SubCutaneous every 24 hours  escitalopram 10 milliGRAM(s) Oral daily  glucagon  Injectable 1 milliGRAM(s) IntraMuscular once  insulin lispro (ADMELOG) corrective regimen sliding scale   SubCutaneous three times a day before meals  insulin lispro (ADMELOG) corrective regimen sliding scale   SubCutaneous at bedtime  lamoTRIgine 200 milliGRAM(s) Oral every 24 hours  traZODone 50 milliGRAM(s) Oral every 24 hours    MEDICATIONS  (PRN):  clonazePAM  Tablet 1 milliGRAM(s) Oral daily PRN Anxiety  dextrose Oral Gel 15 Gram(s) Oral once PRN Blood Glucose LESS THAN 70 milliGRAM(s)/deciliter  oxyCODONE    IR 5 milliGRAM(s) Oral every 6 hours PRN Moderate Pain (4 - 6)  oxyCODONE    IR 10 milliGRAM(s) Oral every 6 hours PRN Severe Pain (7 - 10)      OBJECTIVE:    Vital Signs Last 24 Hrs  T(C): 36.6 (07 Aug 2023 05:48), Max: 36.9 (06 Aug 2023 21:17)  T(F): 97.9 (07 Aug 2023 05:48), Max: 98.4 (06 Aug 2023 21:17)  HR: 66 (07 Aug 2023 05:48) (66 - 81)  BP: 103/68 (07 Aug 2023 05:48) (98/65 - 118/74)  BP(mean): --  RR: 18 (07 Aug 2023 05:48) (18 - 18)  SpO2: 93% (07 Aug 2023 05:48) (93% - 94%)    Parameters below as of 07 Aug 2023 05:48  Patient On (Oxygen Delivery Method): room air            I&O's Detail    06 Aug 2023 07:01  -  07 Aug 2023 07:00  --------------------------------------------------------  IN:    Oral Fluid: 960 mL  Total IN: 960 mL    OUT:  Total OUT: 0 mL    Total NET: 960 mL          Daily     Daily     LABS:                        12.0   6.39  )-----------( 334      ( 07 Aug 2023 07:26 )             38.1     08-06    142  |  105  |  21  ----------------------------<  109<H>  4.4   |  26  |  1.00    Ca    9.3      06 Aug 2023 07:18  Phos  3.3     08-06  Mg     2.0     08-06        Urinalysis Basic - ( 06 Aug 2023 07:18 )    Color: x / Appearance: x / SG: x / pH: x  Gluc: 109 mg/dL / Ketone: x  / Bili: x / Urobili: x   Blood: x / Protein: x / Nitrite: x   Leuk Esterase: x / RBC: x / WBC x   Sq Epi: x / Non Sq Epi: x / Bacteria: x                PHYSICAL EXAM:    General: NAD, resting comfortably in bed  Respiratory: Nonlabored respirations  Abdomen: soft, nontender, nondistended  Vascular: extremities are warm and well perfused.           
STATUS POST:      POST OPERATIVE DAY #:     Vital Signs Last 24 Hrs  T(C): 36.8 (05 Aug 2023 18:06), Max: 37.1 (05 Aug 2023 01:02)  T(F): 98.2 (05 Aug 2023 18:06), Max: 98.7 (05 Aug 2023 01:02)  HR: 68 (05 Aug 2023 18:06) (68 - 77)  BP: 118/70 (05 Aug 2023 18:06) (95/63 - 118/81)  BP(mean): --  RR: 18 (05 Aug 2023 18:06) (18 - 20)  SpO2: 93% (05 Aug 2023 18:06) (88% - 98%)    Parameters below as of 05 Aug 2023 18:06  Patient On (Oxygen Delivery Method): room air    SUBJECTIVE: Pt seen. Resting comfortably in bed with no complaints. Denies chest pain, SOB, nausea, vomiting.     General Appearance: Appears well, NAD  Neck: C-collar in place.   Chest: Equal expansion bilaterally, equal breath sounds  CV: Pulse regular presently  Abdomen: Soft, nontender, nondistended   Extremities: Grossly symmetric, SCD's in place     I&O's Summary    04 Aug 2023 07:01  -  05 Aug 2023 07:00  --------------------------------------------------------  IN: 300 mL / OUT: 0 mL / NET: 300 mL    05 Aug 2023 07:01  -  05 Aug 2023 18:53  --------------------------------------------------------  IN: 260 mL / OUT: 0 mL / NET: 260 mL      I&O's Detail    04 Aug 2023 07:01  -  05 Aug 2023 07:00  --------------------------------------------------------  IN:    Oral Fluid: 300 mL  Total IN: 300 mL    OUT:  Total OUT: 0 mL    Total NET: 300 mL      05 Aug 2023 07:01  -  05 Aug 2023 18:53  --------------------------------------------------------  IN:    Oral Fluid: 260 mL  Total IN: 260 mL    OUT:  Total OUT: 0 mL    Total NET: 260 mL    MEDICATIONS  (STANDING):  acetaminophen     Tablet .. 975 milliGRAM(s) Oral every 6 hours  dextrose 5%. 1000 milliLiter(s) (50 mL/Hr) IV Continuous <Continuous>  dextrose 5%. 1000 milliLiter(s) (100 mL/Hr) IV Continuous <Continuous>  dextrose 50% Injectable 25 Gram(s) IV Push once  dextrose 50% Injectable 12.5 Gram(s) IV Push once  dextrose 50% Injectable 25 Gram(s) IV Push once  enoxaparin Injectable 40 milliGRAM(s) SubCutaneous every 24 hours  escitalopram 10 milliGRAM(s) Oral daily  glucagon  Injectable 1 milliGRAM(s) IntraMuscular once  insulin lispro (ADMELOG) corrective regimen sliding scale   SubCutaneous three times a day before meals  insulin lispro (ADMELOG) corrective regimen sliding scale   SubCutaneous at bedtime  lamoTRIgine 200 milliGRAM(s) Oral every 24 hours  traZODone 50 milliGRAM(s) Oral every 24 hours    MEDICATIONS  (PRN):  clonazePAM  Tablet 1 milliGRAM(s) Oral daily PRN Anxiety  dextrose Oral Gel 15 Gram(s) Oral once PRN Blood Glucose LESS THAN 70 milliGRAM(s)/deciliter  oxyCODONE    IR 2.5 milliGRAM(s) Oral every 4 hours PRN Moderate Pain (4 - 6)  oxyCODONE    IR 5 milliGRAM(s) Oral every 4 hours PRN Severe Pain (7 - 10)      LABS:                        11.2   6.62  )-----------( 251      ( 05 Aug 2023 15:56 )             35.6     08-05    142  |  106  |  22  ----------------------------<  108<H>  3.9   |  27  |  1.04    Ca    8.9      05 Aug 2023 15:56    TPro  7.2  /  Alb  3.1<L>  /  TBili  0.4  /  DBili  x   /  AST  29  /  ALT  20  /  AlkPhos  83  08-04  
Surgery Progress Note     Subjective:  Patient seen and examined.     Vital Signs:  Vital Signs Last 24 Hrs  T(C): 36.8 (06 Aug 2023 17:02), Max: 36.8 (06 Aug 2023 01:15)  T(F): 98.2 (06 Aug 2023 17:02), Max: 98.3 (06 Aug 2023 13:40)  HR: 81 (06 Aug 2023 17:02) (65 - 81)  BP: 118/74 (06 Aug 2023 17:02) (94/64 - 118/79)  RR: 18 (06 Aug 2023 17:02) (18 - 18)  SpO2: 94% (06 Aug 2023 17:02) (92% - 94%)    Parameters below as of 06 Aug 2023 17:02  Patient On (Oxygen Delivery Method): room air        CAPILLARY BLOOD GLUCOSE      POCT Blood Glucose.: 144 mg/dL (06 Aug 2023 17:38)  POCT Blood Glucose.: 110 mg/dL (06 Aug 2023 12:26)  POCT Blood Glucose.: 116 mg/dL (06 Aug 2023 08:54)  POCT Blood Glucose.: 135 mg/dL (05 Aug 2023 21:27)      I&O's Detail    05 Aug 2023 07:01  -  06 Aug 2023 07:00  --------------------------------------------------------  IN:    Oral Fluid: 540 mL  Total IN: 540 mL    OUT:    Voided (mL): 400 mL  Total OUT: 400 mL    Total NET: 140 mL      06 Aug 2023 07:01  -  06 Aug 2023 20:14  --------------------------------------------------------  IN:    Oral Fluid: 720 mL  Total IN: 720 mL    OUT:  Total OUT: 0 mL    Total NET: 720 mL            Physical Exam:  General: NAD, resting comfortably in bed  Respiratory: Nonlabored respirations  Cardio: pulse present  Abdomen: soft, nontender, nondistended  Vascular: extremities are warm and well perfused.       Labs:    08-06    142  |  105  |  21  ----------------------------<  109<H>  4.4   |  26  |  1.00    Ca    9.3      06 Aug 2023 07:18  Phos  3.3     08-06  Mg     2.0     08-06                              12.2   6.84  )-----------( 310      ( 06 Aug 2023 07:21 )             39.0

## 2023-08-07 NOTE — BH CONSULTATION LIAISON PROGRESS NOTE - NSBHFUPINTERVALHXFT_PSY_A_CORE
Pt found sitting in bed, alert and oriented. Pt says she is in a better mood and is looking forward to her back brace fitting and going home. Pt denies SI/HI. Pt is in a pleasant mood, talkative, with no irritability, decreased sleep, pressured speech, distractibility, or psychomotor agitation noted. Pt kelly lack of energy, appetite, or sleep. Pt says that since she was in the hospital last for SA, son has taken all of her medications out of the house and manages when she takes her medications. Pt looks forward to her 2PM appointment with her mental health counselor Ernestine Kothari, whom she has a phone visit with every other monday. Pt states that prior to Pt states that if she were to have suicidal ideation and feel "low" like she did 2 weeks ago, she would feel comfortable reaching out to sister Jessie or other family members.      Unable to reach Dr. Juan Hoffmann ([phone]) for collateral, left a message for discussion.

## 2023-08-14 ENCOUNTER — APPOINTMENT (OUTPATIENT)
Dept: ORTHOPEDIC SURGERY | Facility: CLINIC | Age: 63
End: 2023-08-14
Payer: COMMERCIAL

## 2023-08-14 VITALS
TEMPERATURE: 96.6 F | BODY MASS INDEX: 24.84 KG/M2 | HEART RATE: 73 BPM | HEIGHT: 62 IN | SYSTOLIC BLOOD PRESSURE: 119 MMHG | DIASTOLIC BLOOD PRESSURE: 79 MMHG | WEIGHT: 135 LBS

## 2023-08-14 DIAGNOSIS — M40.209 UNSPECIFIED KYPHOSIS, SITE UNSPECIFIED: ICD-10-CM

## 2023-08-14 DIAGNOSIS — S22.009A UNSPECIFIED FRACTURE OF UNSPECIFIED THORACIC VERTEBRA, INITIAL ENCOUNTER FOR CLOSED FRACTURE: ICD-10-CM

## 2023-08-14 PROCEDURE — 99213 OFFICE O/P EST LOW 20 MIN: CPT

## 2023-08-14 NOTE — PHYSICAL EXAM
[Cane] : ambulates with cane [de-identified] : Examination of the thoracic and lumbar spine reveals no midline tenderness palpation, step-offs, or skin lesions. Decreased range of motion with respect to flexion, extension, lateral bending, and rotation. No tenderness to palpation of the sciatic notch. No tenderness palpation of the bilateral greater trochanters. No pain with passive internal/external rotation of the hips. No instability of bilateral lower extremities.  Negative LIZETTE. Negative straight leg raise bilaterally. No bowstring. Negative femoral stretch. 5 out of 5 iliopsoas, hip abductors, hips adductors, quadriceps, hamstrings, gastrocsoleus, tibialis anterior, extensor hallucis longus, peroneals. Grossly intact sensation to light touch bilateral lower extremities. 1+ patellar and Achilles reflexes. Downgoing Babinski. No clonus. Intact proprioception. Palpable pulses. No skin lesion and no edema on the right and left lower extremities. [de-identified] : Review of the thoracic MRI reveals a T5 compression fracture with some kyphosis.  No obvious evidence of pathologic component although contrast-enhanced imaging is recommended.

## 2023-08-14 NOTE — HISTORY OF PRESENT ILLNESS
[de-identified] : Ms. YANIQUE HORAN  is a 63 year old female who presents with right thoracolumbar pain after a fall on 8/7/23.  She went to the ER and was diagnosed with a thoracic fx.  She was given a brace and pain medications.  Her symptoms are improving.  Denies any LE radicular symptoms or any pain/numbness/tingling wrapping around her chest.  Normal bowel and bladder control.   Denies any recent fevers, chills, sweats, weight loss, or infection.  The patients past medical history, past surgical history, medications, allergies, and social history were reviewed by me today with the patient and documented accordingly.  In addition, the patient's family history, which is noncontributory to their visit, was also reviewed.

## 2023-08-14 NOTE — DISCUSSION/SUMMARY
[de-identified] : We discussed further treatment options.  She denies a significant amount of discomfort.  She is in a brace.  We will obtain an MRI with contrast as recommended by the radiologist.  Follow-up afterwards.

## 2023-08-16 ENCOUNTER — RESULT REVIEW (OUTPATIENT)
Age: 63
End: 2023-08-16

## 2023-08-16 ENCOUNTER — APPOINTMENT (OUTPATIENT)
Dept: MRI IMAGING | Facility: CLINIC | Age: 63
End: 2023-08-16
Payer: COMMERCIAL

## 2023-08-16 ENCOUNTER — OUTPATIENT (OUTPATIENT)
Dept: OUTPATIENT SERVICES | Facility: HOSPITAL | Age: 63
LOS: 1 days | End: 2023-08-16
Payer: COMMERCIAL

## 2023-08-16 DIAGNOSIS — Z98.890 OTHER SPECIFIED POSTPROCEDURAL STATES: Chronic | ICD-10-CM

## 2023-08-16 DIAGNOSIS — Z00.00 ENCOUNTER FOR GENERAL ADULT MEDICAL EXAMINATION WITHOUT ABNORMAL FINDINGS: ICD-10-CM

## 2023-08-16 PROCEDURE — 72157 MRI CHEST SPINE W/O & W/DYE: CPT | Mod: 26

## 2023-08-16 PROCEDURE — 72157 MRI CHEST SPINE W/O & W/DYE: CPT

## 2023-08-16 PROCEDURE — A9585: CPT

## 2023-08-24 ENCOUNTER — NON-APPOINTMENT (OUTPATIENT)
Age: 63
End: 2023-08-24

## 2023-11-06 NOTE — ED PROVIDER NOTE - MDM PATIENT STATEMENT FOR ADDL TREATMENT
~Cardiology Clinic Visit~    Primary Cardiologist: Needs MD establish in [place]  Reason for visit: Afib follow up    History of Present Illness    Dom Durham is a pleasant 61 year old patient with past medical history significant for:     New paroxysmal atrial fibrillation with RVR  [id number] Score 1 (hypertension  Cardiomyopathy  Hypertension  Obesity  EtOH dependance   Hyperlipidemia  Hypothyroidism  Untreated sleep apnea     The patient was admitted to [facility] on 9/14/2023 as a transfer from [facility] with weeping ulcers of his legs.  He was found to be in atrial fibrillation with RVR.  The patient's wife had passed away in July and he had been drinking more alcohol than usual.  Several days prior to admission he noticed worsening dyspnea on exertion as well as orthopnea.  He was placed on diltiazem infusion as well as metoprolol 25 mg twice daily and a heparin drip.     He ultimately underwent a JUANY guided cardioversion.  His echocardiogram while in AF with RVR showed EF of 30 to 35% with mild dilatation of the LV.  He was placed on GDMT with lisinopril 2.5 mg daily, metoprolol XL 12.5 mg twice daily and on anticoagulation with apixaban 5 mg twice daily.     In follow-up, his EKG unfortunately revealed atrial fibrillation with a rate of 196 bpm.  He was unaware that he was in A-fib with RVR.  A repeat echocardiogram on 10/18/2023 that noted his LVEF had improved to 50 to 55%.  At that time, he had not taken his cardiac medications since he ran out approximately on 10/15/2023.       Given elevated rates, he was recommended repeat cardioversion.  Unfortunately, he was not on uninterrupted anticoagulation.  He was also unsure if he would be able to secure transportation to [facility] for JUANY/DCCV.  Again, he was asymptomatic to the arrhythmia.     With this, his Eliquis was resumed.  Lisinopril was held, and Metoprolol increased to 50 mg daily.     Diagnotic studies:  EKG (10/24/2023): AF with  RVR  Echocardiogram (10/18/2023): LVEF of 50 to 55% without wall motion or valvular abnormalities.  Event monitor (9/15/2023): Sinus rhythm throughout with brief runs of SVT (less than 10 seconds)  Echocardiogram (9/14/2023): LVEF 30-35%    Interval 11/07/23    EKG today in clinic showed SR.  Patient says that he is feeling much better on the current medications and with his heart in a regular rhythm.  He still mentions that he occasionally forgets to take Eliquis, and we discussed the importance of that.  __________________________________________________________________         Assessment and Impression:     Atrial fibrillation, new diagnosis (asymptomatic)  [id number] score of 1 anticoagulated on apixaban 5 mg twice daily.    Anticoagulation resumed 10/24/23.  EKG today notes NSR  On Metoprolol XL 50 mg daily.     Cardiomyopathy  Etiology: Likely tachycardia mediated  LVEF 30 to 35% on 9/14/2023 and improved to 50 to 55% on 10/18/2023  Stopped GDMT 2/2 running out (10/15/2023.)  Resumed Toprol XL 50 mg daily.  Others on hold to allow for arrhythmia management.  Discharge weight on 9/15/2023 was 217 pounds and weight today is 223 pounds     Hypertension  Well-controlled         Recommendations and Plan:     SR today on EKG; no need for cardioversion.  Continue Eliquis 5 mg twice daily.  Continue Toprol XL 50 mg daily.  Restart Lisinopril 2.5 mg daily; recheck BMP in 2-weeks.  Follow up with ANA ROSA in 3-months for BP and heart rhythm review.  __________________________________________________________________    Thank you for the opportunity to participate in this pleasant patient's care.    We would be happy to see this patient sooner for any concerns in the meantime.    KASIE Luther, CNP   Nurse Practitioner  [facility]    Today's clinic visit entailed:  Review of the result(s) of each unique test - EKG, echo, other cardiac imaging, labs  The following tests were independently  "interpreted by me as noted in my documentation: ekg  Ordering of each unique test  Prescription drug management  The level of medical decision making during this visit was of moderate complexity.    Orders this Visit:  Orders Placed This Encounter   Procedures    Basic metabolic panel    Follow-Up with Cardiology- ANA ROSA    EKG 12-lead complete w/read - Clinics     Orders Placed This Encounter   Medications    lisinopril (ZESTRIL) 2.5 MG tablet     Sig: Take 1 tablet (2.5 mg) by mouth daily     Dispense:  30 tablet     Refill:  3    apixaban ANTICOAGULANT (ELIQUIS) 5 MG tablet     Sig: Take 1 tablet (5 mg) by mouth 2 times daily     Dispense:  60 tablet     Refill:  4     Medications Discontinued During This Encounter   Medication Reason    apixaban ANTICOAGULANT (ELIQUIS) 5 MG tablet Reorder (No AVS)     Encounter Diagnoses   Name Primary?    New onset a-fib (H)     Cardiomyopathy, unspecified type (H)     Atrial fibrillation with RVR (H) Yes     CURRENT MEDICATIONS:  Current Outpatient Medications   Medication Sig Dispense Refill    apixaban ANTICOAGULANT (ELIQUIS) 5 MG tablet Take 1 tablet (5 mg) by mouth 2 times daily 60 tablet 4    levothyroxine (SYNTHROID/LEVOTHROID) 88 MCG tablet Take 1 tablet (88 mcg) by mouth daily 90 tablet 4    lisinopril (ZESTRIL) 2.5 MG tablet Take 1 tablet (2.5 mg) by mouth daily 30 tablet 3    metoprolol succinate ER (TOPROL XL) 25 MG 24 hr tablet Take 2 tablets (50 mg) by mouth daily 90 tablet 3    tobramycin (TOBREX) 0.3 % ophthalmic solution Place 1-2 drops into both eyes every 2 hours (Patient not taking: Reported on 10/24/2023) 5 mL 0     ALLERGIES     Allergies   Allergen Reactions    Amoxicillin-Pot Clavulanate Other (See Comments)     \"I was so weak and just felt like dying\"    Dilaudid [Hydromorphone] Nausea and Vomiting     \"ok with Percocet\"    Hydrocodone Nausea     \"also did not do anything for my pain\"     PAST MEDICAL, SURGICAL, FAMILY, SOCIAL HISTORY:  History was " reviewed and updated as needed, see medical record.    Review of Systems:  A 10-point Review Of Systems is otherwise normal except for that which is noted in the HPI and interval summary.    Physical Exam:    Vitals: BP (!) 144/86   Pulse 64   Ht 1.829 m (6')   Wt 101.2 kg (223 lb)   SpO2 97%   BMI 30.24 kg/m    Constitutional: Appears stated age, well nourished, NAD.  Neck: Supple. Carotid pulses full and equal.   Respiratory: Non-labored. Lungs CTAB.  Cardiovascular: RRR, normal S1 and S2. No M/G/R.  No edema.  Musculoskeletal/Extremities: Symmetrical movement to all extremities.  Neurologic: No gross focal deficits. Alert, awake, and oriented to all spheres.  Psychiatric: Affect appropriate. Mentation normal.    Recent Lab Results:  LIPID RESULTS:  Lab Results   Component Value Date    CHOL 198 01/23/2023    CHOL 195 08/24/2015    HDL 47 01/23/2023    HDL 40 (L) 08/24/2015     (H) 01/23/2023     (H) 08/24/2015    TRIG 90 01/23/2023    TRIG 65 08/24/2015    CHOLHDLRATIO 4.9 08/24/2015     LIVER ENZYME RESULTS:  Lab Results   Component Value Date    AST 60 (H) 09/15/2023    AST 44 10/09/2017    ALT 64 09/15/2023    ALT 60 10/09/2017     CBC RESULTS:  Lab Results   Component Value Date    WBC 8.6 09/14/2023    WBC 7.5 10/05/2017    RBC 4.96 09/14/2023    RBC 5.17 10/05/2017    HGB 16.5 09/14/2023    HGB 16.2 10/05/2017    HCT 48.6 09/14/2023    HCT 49.5 10/05/2017    MCV 98 09/14/2023    MCV 96 10/05/2017    MCH 33.3 (H) 09/14/2023    MCH 31.3 10/05/2017    MCHC 34.0 09/14/2023    MCHC 32.7 10/05/2017    RDW 12.9 09/14/2023    RDW 12.7 10/05/2017     09/14/2023     10/05/2017     BMP RESULTS:  Lab Results   Component Value Date     09/14/2023     10/09/2017    POTASSIUM 4.2 09/15/2023    POTASSIUM 4.4 10/08/2021    POTASSIUM 4.0 10/09/2017    CHLORIDE 106 09/14/2023    CHLORIDE 105 10/08/2021    CHLORIDE 105 10/09/2017    CO2 20 (L) 09/14/2023    CO2 25 10/08/2021    CO2  "26 10/09/2017    ANIONGAP 12 09/14/2023    ANIONGAP 6 10/08/2021    ANIONGAP 8 10/09/2017     (H) 09/14/2023     (H) 10/08/2021    GLC 90 10/09/2017    BUN 9.2 09/14/2023    BUN 18 10/08/2021    BUN 11 10/09/2017    CR 1.03 09/14/2023    CR 0.85 10/09/2017    GFRESTIMATED 83 09/14/2023    GFRESTIMATED >90 10/09/2017    GFRESTBLACK >90 10/09/2017    SHELIA 9.4 09/14/2023    SHELIA 9.5 10/09/2017      A1C RESULTS:  No results found for: \"A1C\"  INR RESULTS:  Lab Results   Component Value Date    INR 1.30 (H) 09/14/2023                     " Patient with one or more new problems requiring additional work-up/treatment.

## 2023-11-15 NOTE — PATIENT PROFILE ADULT - DEAF OR HARD OF HEARING?
I agree with his concerns about his health.  Given his health condition, he really needs to establish care with a physician for long-term management.    If I start him on TPN again and I leave in next 3 weeks, there will be no follow-up plan and management.  It is a safety concern.  Please order CBC, BMP, LFTs in the meantime to check how his labs and electrolytes are.   no

## 2024-04-02 ENCOUNTER — RESULT REVIEW (OUTPATIENT)
Age: 64
End: 2024-04-02

## 2024-04-02 ENCOUNTER — TRANSCRIPTION ENCOUNTER (OUTPATIENT)
Age: 64
End: 2024-04-02

## 2024-04-02 ENCOUNTER — INPATIENT (INPATIENT)
Facility: HOSPITAL | Age: 64
LOS: 0 days | Discharge: HOME | DRG: 93 | End: 2024-04-02
Attending: PSYCHIATRY & NEUROLOGY | Admitting: PSYCHIATRY & NEUROLOGY
Payer: COMMERCIAL

## 2024-04-02 VITALS
HEART RATE: 67 BPM | HEIGHT: 62 IN | WEIGHT: 139.99 LBS | DIASTOLIC BLOOD PRESSURE: 92 MMHG | RESPIRATION RATE: 17 BRPM | OXYGEN SATURATION: 96 % | TEMPERATURE: 98 F | SYSTOLIC BLOOD PRESSURE: 142 MMHG

## 2024-04-02 VITALS
HEART RATE: 71 BPM | SYSTOLIC BLOOD PRESSURE: 103 MMHG | TEMPERATURE: 99 F | RESPIRATION RATE: 17 BRPM | DIASTOLIC BLOOD PRESSURE: 69 MMHG | OXYGEN SATURATION: 98 %

## 2024-04-02 DIAGNOSIS — Z98.890 OTHER SPECIFIED POSTPROCEDURAL STATES: Chronic | ICD-10-CM

## 2024-04-02 DIAGNOSIS — R27.0 ATAXIA, UNSPECIFIED: ICD-10-CM

## 2024-04-02 LAB
24R-OH-CALCIDIOL SERPL-MCNC: 140 NG/ML — HIGH (ref 30–80)
A1C WITH ESTIMATED AVERAGE GLUCOSE RESULT: 6.1 % — HIGH (ref 4–5.6)
ADD ON TEST-SPECIMEN IN LAB: SIGNIFICANT CHANGE UP
ALBUMIN SERPL ELPH-MCNC: 3.7 G/DL — SIGNIFICANT CHANGE UP (ref 3.3–5)
ALP SERPL-CCNC: 99 U/L — SIGNIFICANT CHANGE UP (ref 40–120)
ALT FLD-CCNC: 11 U/L — SIGNIFICANT CHANGE UP (ref 10–45)
ANION GAP SERPL CALC-SCNC: 10 MMOL/L — SIGNIFICANT CHANGE UP (ref 5–17)
ANION GAP SERPL CALC-SCNC: 13 MMOL/L — SIGNIFICANT CHANGE UP (ref 5–17)
APTT BLD: 32.1 SEC — SIGNIFICANT CHANGE UP (ref 24.5–35.6)
AST SERPL-CCNC: 15 U/L — SIGNIFICANT CHANGE UP (ref 10–40)
BASE EXCESS BLDV CALC-SCNC: 2.6 MMOL/L — SIGNIFICANT CHANGE UP (ref -2–3)
BASOPHILS # BLD AUTO: 0.02 K/UL — SIGNIFICANT CHANGE UP (ref 0–0.2)
BASOPHILS NFR BLD AUTO: 0.3 % — SIGNIFICANT CHANGE UP (ref 0–2)
BILIRUB SERPL-MCNC: 0.2 MG/DL — SIGNIFICANT CHANGE UP (ref 0.2–1.2)
BUN SERPL-MCNC: 13 MG/DL — SIGNIFICANT CHANGE UP (ref 7–23)
BUN SERPL-MCNC: 15 MG/DL — SIGNIFICANT CHANGE UP (ref 7–23)
CA-I SERPL-SCNC: 1.22 MMOL/L — SIGNIFICANT CHANGE UP (ref 1.15–1.33)
CALCIUM SERPL-MCNC: 8.5 MG/DL — SIGNIFICANT CHANGE UP (ref 8.4–10.5)
CALCIUM SERPL-MCNC: 8.8 MG/DL — SIGNIFICANT CHANGE UP (ref 8.4–10.5)
CHLORIDE BLDV-SCNC: 104 MMOL/L — SIGNIFICANT CHANGE UP (ref 96–108)
CHLORIDE SERPL-SCNC: 106 MMOL/L — SIGNIFICANT CHANGE UP (ref 96–108)
CHLORIDE SERPL-SCNC: 107 MMOL/L — SIGNIFICANT CHANGE UP (ref 96–108)
CHOLEST SERPL-MCNC: 206 MG/DL — HIGH
CO2 BLDV-SCNC: 31 MMOL/L — HIGH (ref 22–26)
CO2 SERPL-SCNC: 22 MMOL/L — SIGNIFICANT CHANGE UP (ref 22–31)
CO2 SERPL-SCNC: 25 MMOL/L — SIGNIFICANT CHANGE UP (ref 22–31)
CREAT SERPL-MCNC: 0.95 MG/DL — SIGNIFICANT CHANGE UP (ref 0.5–1.3)
CREAT SERPL-MCNC: 1.08 MG/DL — SIGNIFICANT CHANGE UP (ref 0.5–1.3)
EGFR: 58 ML/MIN/1.73M2 — LOW
EGFR: 68 ML/MIN/1.73M2 — SIGNIFICANT CHANGE UP
EOSINOPHIL # BLD AUTO: 0.07 K/UL — SIGNIFICANT CHANGE UP (ref 0–0.5)
EOSINOPHIL NFR BLD AUTO: 0.9 % — SIGNIFICANT CHANGE UP (ref 0–6)
ESTIMATED AVERAGE GLUCOSE: 128 MG/DL — HIGH (ref 68–114)
GAS PNL BLDV: 139 MMOL/L — SIGNIFICANT CHANGE UP (ref 136–145)
GAS PNL BLDV: SIGNIFICANT CHANGE UP
GAS PNL BLDV: SIGNIFICANT CHANGE UP
GLUCOSE BLDC GLUCOMTR-MCNC: 89 MG/DL — SIGNIFICANT CHANGE UP (ref 70–99)
GLUCOSE BLDC GLUCOMTR-MCNC: 97 MG/DL — SIGNIFICANT CHANGE UP (ref 70–99)
GLUCOSE BLDV-MCNC: 118 MG/DL — HIGH (ref 70–99)
GLUCOSE SERPL-MCNC: 104 MG/DL — HIGH (ref 70–99)
GLUCOSE SERPL-MCNC: 122 MG/DL — HIGH (ref 70–99)
HCO3 BLDV-SCNC: 29 MMOL/L — SIGNIFICANT CHANGE UP (ref 22–29)
HCT VFR BLD CALC: 37.6 % — SIGNIFICANT CHANGE UP (ref 34.5–45)
HCT VFR BLD CALC: 39.3 % — SIGNIFICANT CHANGE UP (ref 34.5–45)
HCT VFR BLDA CALC: 38 % — SIGNIFICANT CHANGE UP (ref 34.5–46.5)
HCV AB S/CO SERPL IA: 0.07 S/CO — SIGNIFICANT CHANGE UP (ref 0–0.99)
HCV AB SERPL-IMP: SIGNIFICANT CHANGE UP
HDLC SERPL-MCNC: 57 MG/DL — SIGNIFICANT CHANGE UP
HGB BLD CALC-MCNC: 12.8 G/DL — SIGNIFICANT CHANGE UP (ref 11.7–16.1)
HGB BLD-MCNC: 12 G/DL — SIGNIFICANT CHANGE UP (ref 11.5–15.5)
HGB BLD-MCNC: 12.6 G/DL — SIGNIFICANT CHANGE UP (ref 11.5–15.5)
IMM GRANULOCYTES NFR BLD AUTO: 0.4 % — SIGNIFICANT CHANGE UP (ref 0–0.9)
INR BLD: 0.98 RATIO — SIGNIFICANT CHANGE UP (ref 0.85–1.18)
LACTATE BLDV-MCNC: 1.4 MMOL/L — SIGNIFICANT CHANGE UP (ref 0.5–2)
LIPID PNL WITH DIRECT LDL SERPL: 132 MG/DL — HIGH
LYMPHOCYTES # BLD AUTO: 1.69 K/UL — SIGNIFICANT CHANGE UP (ref 1–3.3)
LYMPHOCYTES # BLD AUTO: 22.8 % — SIGNIFICANT CHANGE UP (ref 13–44)
MCHC RBC-ENTMCNC: 29.9 PG — SIGNIFICANT CHANGE UP (ref 27–34)
MCHC RBC-ENTMCNC: 30.1 PG — SIGNIFICANT CHANGE UP (ref 27–34)
MCHC RBC-ENTMCNC: 31.9 GM/DL — LOW (ref 32–36)
MCHC RBC-ENTMCNC: 32.1 GM/DL — SIGNIFICANT CHANGE UP (ref 32–36)
MCV RBC AUTO: 93.8 FL — SIGNIFICANT CHANGE UP (ref 80–100)
MCV RBC AUTO: 93.8 FL — SIGNIFICANT CHANGE UP (ref 80–100)
MONOCYTES # BLD AUTO: 0.44 K/UL — SIGNIFICANT CHANGE UP (ref 0–0.9)
MONOCYTES NFR BLD AUTO: 5.9 % — SIGNIFICANT CHANGE UP (ref 2–14)
NEUTROPHILS # BLD AUTO: 5.15 K/UL — SIGNIFICANT CHANGE UP (ref 1.8–7.4)
NEUTROPHILS NFR BLD AUTO: 69.7 % — SIGNIFICANT CHANGE UP (ref 43–77)
NON HDL CHOLESTEROL: 149 MG/DL — HIGH
NRBC # BLD: 0 /100 WBCS — SIGNIFICANT CHANGE UP (ref 0–0)
NRBC # BLD: 0 /100 WBCS — SIGNIFICANT CHANGE UP (ref 0–0)
PCO2 BLDV: 53 MMHG — HIGH (ref 39–42)
PH BLDV: 7.35 — SIGNIFICANT CHANGE UP (ref 7.32–7.43)
PLATELET # BLD AUTO: 360 K/UL — SIGNIFICANT CHANGE UP (ref 150–400)
PLATELET # BLD AUTO: 361 K/UL — SIGNIFICANT CHANGE UP (ref 150–400)
PO2 BLDV: 30 MMHG — SIGNIFICANT CHANGE UP (ref 25–45)
POTASSIUM BLDV-SCNC: 4 MMOL/L — SIGNIFICANT CHANGE UP (ref 3.5–5.1)
POTASSIUM SERPL-MCNC: 4 MMOL/L — SIGNIFICANT CHANGE UP (ref 3.5–5.3)
POTASSIUM SERPL-MCNC: 4.9 MMOL/L — SIGNIFICANT CHANGE UP (ref 3.5–5.3)
POTASSIUM SERPL-SCNC: 4 MMOL/L — SIGNIFICANT CHANGE UP (ref 3.5–5.3)
POTASSIUM SERPL-SCNC: 4.9 MMOL/L — SIGNIFICANT CHANGE UP (ref 3.5–5.3)
PROT SERPL-MCNC: 6.9 G/DL — SIGNIFICANT CHANGE UP (ref 6–8.3)
PROTHROM AB SERPL-ACNC: 10.8 SEC — SIGNIFICANT CHANGE UP (ref 9.5–13)
RBC # BLD: 4.01 M/UL — SIGNIFICANT CHANGE UP (ref 3.8–5.2)
RBC # BLD: 4.19 M/UL — SIGNIFICANT CHANGE UP (ref 3.8–5.2)
RBC # FLD: 13.8 % — SIGNIFICANT CHANGE UP (ref 10.3–14.5)
RBC # FLD: 13.8 % — SIGNIFICANT CHANGE UP (ref 10.3–14.5)
SAO2 % BLDV: 45.2 % — LOW (ref 67–88)
SODIUM SERPL-SCNC: 139 MMOL/L — SIGNIFICANT CHANGE UP (ref 135–145)
SODIUM SERPL-SCNC: 144 MMOL/L — SIGNIFICANT CHANGE UP (ref 135–145)
TRIGL SERPL-MCNC: 94 MG/DL — SIGNIFICANT CHANGE UP
TROPONIN T, HIGH SENSITIVITY RESULT: <6 NG/L — SIGNIFICANT CHANGE UP (ref 0–51)
TSH SERPL-MCNC: 0.75 UIU/ML — SIGNIFICANT CHANGE UP (ref 0.27–4.2)
VIT B12 SERPL-MCNC: 658 PG/ML — SIGNIFICANT CHANGE UP (ref 232–1245)
WBC # BLD: 6.58 K/UL — SIGNIFICANT CHANGE UP (ref 3.8–10.5)
WBC # BLD: 7.4 K/UL — SIGNIFICANT CHANGE UP (ref 3.8–10.5)
WBC # FLD AUTO: 6.58 K/UL — SIGNIFICANT CHANGE UP (ref 3.8–10.5)
WBC # FLD AUTO: 7.4 K/UL — SIGNIFICANT CHANGE UP (ref 3.8–10.5)

## 2024-04-02 PROCEDURE — 93356 MYOCRD STRAIN IMG SPCKL TRCK: CPT

## 2024-04-02 PROCEDURE — 80053 COMPREHEN METABOLIC PANEL: CPT

## 2024-04-02 PROCEDURE — 82962 GLUCOSE BLOOD TEST: CPT

## 2024-04-02 PROCEDURE — 70496 CT ANGIOGRAPHY HEAD: CPT | Mod: MC

## 2024-04-02 PROCEDURE — 99291 CRITICAL CARE FIRST HOUR: CPT

## 2024-04-02 PROCEDURE — 84443 ASSAY THYROID STIM HORMONE: CPT

## 2024-04-02 PROCEDURE — 0042T: CPT | Mod: MC

## 2024-04-02 PROCEDURE — 86803 HEPATITIS C AB TEST: CPT

## 2024-04-02 PROCEDURE — 70498 CT ANGIOGRAPHY NECK: CPT | Mod: 26,MC

## 2024-04-02 PROCEDURE — 70496 CT ANGIOGRAPHY HEAD: CPT | Mod: 26,MC

## 2024-04-02 PROCEDURE — 70551 MRI BRAIN STEM W/O DYE: CPT | Mod: MC

## 2024-04-02 PROCEDURE — 83036 HEMOGLOBIN GLYCOSYLATED A1C: CPT

## 2024-04-02 PROCEDURE — 84132 ASSAY OF SERUM POTASSIUM: CPT

## 2024-04-02 PROCEDURE — 85610 PROTHROMBIN TIME: CPT

## 2024-04-02 PROCEDURE — 84484 ASSAY OF TROPONIN QUANT: CPT

## 2024-04-02 PROCEDURE — 85018 HEMOGLOBIN: CPT

## 2024-04-02 PROCEDURE — 70551 MRI BRAIN STEM W/O DYE: CPT | Mod: 26

## 2024-04-02 PROCEDURE — 82947 ASSAY GLUCOSE BLOOD QUANT: CPT

## 2024-04-02 PROCEDURE — 70450 CT HEAD/BRAIN W/O DYE: CPT | Mod: 26,MC,59

## 2024-04-02 PROCEDURE — 82330 ASSAY OF CALCIUM: CPT

## 2024-04-02 PROCEDURE — 93306 TTE W/DOPPLER COMPLETE: CPT | Mod: 26

## 2024-04-02 PROCEDURE — 80048 BASIC METABOLIC PNL TOTAL CA: CPT

## 2024-04-02 PROCEDURE — 82803 BLOOD GASES ANY COMBINATION: CPT

## 2024-04-02 PROCEDURE — 97165 OT EVAL LOW COMPLEX 30 MIN: CPT

## 2024-04-02 PROCEDURE — 97161 PT EVAL LOW COMPLEX 20 MIN: CPT

## 2024-04-02 PROCEDURE — 83605 ASSAY OF LACTIC ACID: CPT

## 2024-04-02 PROCEDURE — 70450 CT HEAD/BRAIN W/O DYE: CPT | Mod: MC

## 2024-04-02 PROCEDURE — 76376 3D RENDER W/INTRP POSTPROCES: CPT

## 2024-04-02 PROCEDURE — 85014 HEMATOCRIT: CPT

## 2024-04-02 PROCEDURE — 80061 LIPID PANEL: CPT

## 2024-04-02 PROCEDURE — 84295 ASSAY OF SERUM SODIUM: CPT

## 2024-04-02 PROCEDURE — 76376 3D RENDER W/INTRP POSTPROCES: CPT | Mod: 26

## 2024-04-02 PROCEDURE — 82435 ASSAY OF BLOOD CHLORIDE: CPT

## 2024-04-02 PROCEDURE — 93306 TTE W/DOPPLER COMPLETE: CPT

## 2024-04-02 PROCEDURE — 85027 COMPLETE CBC AUTOMATED: CPT

## 2024-04-02 PROCEDURE — 85730 THROMBOPLASTIN TIME PARTIAL: CPT

## 2024-04-02 PROCEDURE — 99223 1ST HOSP IP/OBS HIGH 75: CPT

## 2024-04-02 PROCEDURE — 82607 VITAMIN B-12: CPT

## 2024-04-02 PROCEDURE — 85025 COMPLETE CBC W/AUTO DIFF WBC: CPT

## 2024-04-02 PROCEDURE — 82306 VITAMIN D 25 HYDROXY: CPT

## 2024-04-02 PROCEDURE — 70498 CT ANGIOGRAPHY NECK: CPT | Mod: MC

## 2024-04-02 RX ORDER — ESCITALOPRAM OXALATE 10 MG/1
10 TABLET, FILM COATED ORAL DAILY
Refills: 0 | Status: DISCONTINUED | OUTPATIENT
Start: 2024-04-02 | End: 2024-04-02

## 2024-04-02 RX ORDER — CLONAZEPAM 1 MG
1 TABLET ORAL
Refills: 0 | DISCHARGE

## 2024-04-02 RX ORDER — INSULIN LISPRO 100/ML
VIAL (ML) SUBCUTANEOUS
Refills: 0 | Status: DISCONTINUED | OUTPATIENT
Start: 2024-04-02 | End: 2024-04-02

## 2024-04-02 RX ORDER — ATORVASTATIN CALCIUM 80 MG/1
80 TABLET, FILM COATED ORAL AT BEDTIME
Refills: 0 | Status: DISCONTINUED | OUTPATIENT
Start: 2024-04-02 | End: 2024-04-02

## 2024-04-02 RX ORDER — ATORVASTATIN CALCIUM 80 MG/1
1 TABLET, FILM COATED ORAL
Qty: 0 | Refills: 0 | DISCHARGE
Start: 2024-04-02

## 2024-04-02 RX ORDER — DEXTROSE 50 % IN WATER 50 %
15 SYRINGE (ML) INTRAVENOUS ONCE
Refills: 0 | Status: DISCONTINUED | OUTPATIENT
Start: 2024-04-02 | End: 2024-04-02

## 2024-04-02 RX ORDER — SODIUM CHLORIDE 9 MG/ML
1000 INJECTION, SOLUTION INTRAVENOUS
Refills: 0 | Status: DISCONTINUED | OUTPATIENT
Start: 2024-04-02 | End: 2024-04-02

## 2024-04-02 RX ORDER — GLUCAGON INJECTION, SOLUTION 0.5 MG/.1ML
1 INJECTION, SOLUTION SUBCUTANEOUS ONCE
Refills: 0 | Status: DISCONTINUED | OUTPATIENT
Start: 2024-04-02 | End: 2024-04-02

## 2024-04-02 RX ORDER — LAMOTRIGINE 25 MG/1
1.5 TABLET, ORALLY DISINTEGRATING ORAL
Qty: 0 | Refills: 0 | DISCHARGE

## 2024-04-02 RX ORDER — ASPIRIN/CALCIUM CARB/MAGNESIUM 324 MG
81 TABLET ORAL DAILY
Refills: 0 | Status: DISCONTINUED | OUTPATIENT
Start: 2024-04-02 | End: 2024-04-02

## 2024-04-02 RX ORDER — METFORMIN HYDROCHLORIDE 850 MG/1
1 TABLET ORAL
Refills: 0 | DISCHARGE

## 2024-04-02 RX ORDER — DEXTROSE 50 % IN WATER 50 %
25 SYRINGE (ML) INTRAVENOUS ONCE
Refills: 0 | Status: DISCONTINUED | OUTPATIENT
Start: 2024-04-02 | End: 2024-04-02

## 2024-04-02 RX ORDER — ATORVASTATIN CALCIUM 80 MG/1
1 TABLET, FILM COATED ORAL
Qty: 30 | Refills: 3
Start: 2024-04-02 | End: 2024-07-30

## 2024-04-02 RX ORDER — CLONAZEPAM 1 MG
1 TABLET ORAL
Qty: 0 | Refills: 0 | DISCHARGE

## 2024-04-02 RX ORDER — LAMOTRIGINE 25 MG/1
200 TABLET, ORALLY DISINTEGRATING ORAL AT BEDTIME
Refills: 0 | Status: DISCONTINUED | OUTPATIENT
Start: 2024-04-02 | End: 2024-04-02

## 2024-04-02 RX ORDER — LAMOTRIGINE 25 MG/1
1 TABLET, ORALLY DISINTEGRATING ORAL
Refills: 0 | DISCHARGE

## 2024-04-02 RX ORDER — ATORVASTATIN CALCIUM 80 MG/1
40 TABLET, FILM COATED ORAL AT BEDTIME
Refills: 0 | Status: DISCONTINUED | OUTPATIENT
Start: 2024-04-02 | End: 2024-04-02

## 2024-04-02 RX ORDER — TRAZODONE HCL 50 MG
50 TABLET ORAL AT BEDTIME
Refills: 0 | Status: DISCONTINUED | OUTPATIENT
Start: 2024-04-02 | End: 2024-04-02

## 2024-04-02 RX ORDER — DEXTROSE 50 % IN WATER 50 %
12.5 SYRINGE (ML) INTRAVENOUS ONCE
Refills: 0 | Status: DISCONTINUED | OUTPATIENT
Start: 2024-04-02 | End: 2024-04-02

## 2024-04-02 RX ORDER — ENOXAPARIN SODIUM 100 MG/ML
40 INJECTION SUBCUTANEOUS EVERY 24 HOURS
Refills: 0 | Status: DISCONTINUED | OUTPATIENT
Start: 2024-04-02 | End: 2024-04-02

## 2024-04-02 RX ADMIN — ESCITALOPRAM OXALATE 10 MILLIGRAM(S): 10 TABLET, FILM COATED ORAL at 11:20

## 2024-04-02 NOTE — H&P ADULT - NSHPLABSRESULTS_GEN_ALL_CORE
Labs:                        12.6   7.40  )-----------( 361      ( 02 Apr 2024 02:08 )             39.3     04-02    144  |  106  |  15  ----------------------------<  122<H>  4.0   |  25  |  1.08    Ca    8.8      02 Apr 2024 02:08    TPro  6.9  /  Alb  3.7  /  TBili  0.2  /  DBili  x   /  AST  15  /  ALT  11  /  AlkPhos  99  04-02    CAPILLARY BLOOD GLUCOSE      POCT Blood Glucose.: 113 mg/dL (02 Apr 2024 02:10)    LIVER FUNCTIONS - ( 02 Apr 2024 02:08 )  Alb: 3.7 g/dL / Pro: 6.9 g/dL / ALK PHOS: 99 U/L / ALT: 11 U/L / AST: 15 U/L / GGT: x             PT/INR - ( 02 Apr 2024 02:08 )   PT: 10.8 sec;   INR: 0.98 ratio         PTT - ( 02 Apr 2024 02:08 )  PTT:32.1 sec  CSF:                  Radiology:  CT HEAD: No CT evidence of acute intracranial pathology.    CT PERFUSION: No core infarct or penumbra of ischemic tissue is   identified by CT perfusion.    CT ANGIOGRAPHY NECK: Cervical vasculature is patent without evidence of   stenosis or dissection.    CT ANGIOGRAPHY BRAIN:  1. No vessel occlusion, stenosis or aneurysm is identified about the   [place].  Normal anatomic variants as discussed above.  2.  No evidence of dural venous sinus thrombosis or an arteriovenous   malformation.

## 2024-04-02 NOTE — ED ADULT NURSE REASSESSMENT NOTE - NS ED NURSE REASSESS COMMENT FT1
Pt endorses depression with in the last two weeks, denies SI/HI.
Pt is currently using bathroom to urinate in room PRP18, with aid of RN and Son.
[facility]  Pt is A&Ox4, as per break coverage RN Neuro was unable to properly assess patient prior to CT scan, pt wasn't following commands for neuro assessment. MD Judge is at bedside reassessing patient, pt is cooperating well with examination. Son is at bedside.
Pt ambulated with assistance x2 to toilet in the room. Pt unsteady on feet and not following directional instructions.

## 2024-04-02 NOTE — DISCHARGE NOTE PROVIDER - NSDCCPCAREPLAN_GEN_ALL_CORE_FT
PRINCIPAL DISCHARGE DIAGNOSIS  Diagnosis: Ataxia  Assessment and Plan of Treatment: Please follow up with neurologist in 1 week. Continue taking medications as prescribed. Monitor your blood pressure. Reduce fat, cholesterol and salt in your diet. Increase intake of fruits and vegetables. Limit alcohol to minimum and do not smoke. You may be at risk for falling, make changes to your home to help you walk easier. Keep up to date on vaccinations.  If you experience any symptoms of facial drooping, slurred speech, arm or leg weakness, severe headache, vision changes or any worsening symptoms, notify provider immediatley and return to ER.

## 2024-04-02 NOTE — PHYSICAL THERAPY INITIAL EVALUATION ADULT - PERTINENT HX OF CURRENT PROBLEM, REHAB EVAL
63 year old female with pmh bipolar disorder last known normal approx 8 pm tonight pw unsteady gait, falling to right after taking her nightime meds. Denies ha, vertigo, weakness, numbness, tingling, chest pain, shortness of breath, fever, chills, urinary symptoms, slurred speech.  4/2/24: CT brain: No CT evidence of acute intracranial pathology.  CT PERFUSION: No core infarct or penumbra of ischemic tissue is  identified by CT perfusion.  CT ANGIOGRAPHY NECK: Cervical vasculature is patent without evidence of stenosis or dissection.  CT ANGIOGRAPHY BRAIN: 1. No vessel occlusion, stenosis or aneurysm is identified about the [place].  Normal anatomic variants as discussed above. 2.  No evidence of dural venous sinus thrombosis or an arteriovenous  malformation.  MRI head:  Moderate pontine and minimal anterior periventricular white matter ischemia.  Mild mucosal thickening in the BILATERAL maxillary sinuses.

## 2024-04-02 NOTE — PHYSICAL THERAPY INITIAL EVALUATION ADULT - ADDITIONAL COMMENTS
Appointment 8/3/18  Refill set up   PAtient lives in private home with son, 6 steps to enter, first floor set up. Patient independent with ADLS/IADLS, no DME, +drives

## 2024-04-02 NOTE — ED PROVIDER NOTE - CLINICAL SUMMARY MEDICAL DECISION MAKING FREE TEXT BOX
Dr. Corrales (Attending Physician)  63 year old female with pmh bipolar disorder last known normal approx 8 pm tonight pw unsteady gait, falling to right after taking her nightime meds. Denies ha, vertigo, weakness, numbness, tingling, chest pain, shortness of breath, fever, chills, urinary symptoms, slurred speech.    CN 2-12 intact. Strength 5/5 throughout. SILT throughout. Normal finger-to-nose. No pronator drift. Ataxic falling to right on neuro exam.  RRR, no M/R/G, equal pulses bilaterally  Breathing comfortably, clear lungs, no wheezes or crackles   Abdomen soft, nontender, nondistended, no McBurney's point tenderness, negative Pyle's, no CVAT.    Plan: Code stroke called on arrival. Will ct, cta h/n, ct perfusion for possible posterior stroke with new onset ataxia. DDx includes med side effect. Will Follow up neuro recs. Dr. Corrales (Attending Physician)  63 year old female with pmh bipolar disorder last known normal approx 8 pm tonight pw unsteady gait, falling to right after taking her nightime meds. Denies ha, vertigo, weakness, numbness, tingling, chest pain, shortness of breath, fever, chills, urinary symptoms, slurred speech.    CN 2-12 intact. Strength 5/5 throughout. SILT throughout. Right finger-to-nose normal, left finger-to-nose normal. No pronator drift. Ataxic falling to right on neuro exam.  RRR, no M/R/G, equal pulses bilaterally  Breathing comfortably, clear lungs, no wheezes or crackles   Abdomen soft, nontender, nondistended, no McBurney's point tenderness, negative Pyle's, no CVAT.    Plan: Code stroke called on arrival. Will ct, cta h/n, ct perfusion for possible posterior stroke with new onset ataxia. DDx includes med side effect. Will Follow up neuro recs.

## 2024-04-02 NOTE — ED ADULT TRIAGE NOTE - CHIEF COMPLAINT QUOTE
felt weakness of bilateral legs with inability to walk at about 2200 after taking clonopin, Lamotrigine, metformin, Trazadone, lexapro and other meds; no new meds; no recent traumas, falls or accidents.

## 2024-04-02 NOTE — SPEECH LANGUAGE PATHOLOGY EVALUATION - COMMENTS
NIHSS on admission: 1 (+1 LUE ataxia)  LKN: 2024, 20:00  pre-MRS: 0  NO tenecteplase d/t outside therapeutic window.  NO thrombectomy d/t no LVO.    Mental status: Awake, Alert; Oriented to: person, place, and time; Speech: fluent; Repetition and naming: intact; Follows simple and complex commands (but at times struggles with basic instructions); Fund of knowledge: intact    Impression: LUE dysmetria, gait ataxia; c/w left cerebellar dysfunction, d/t acute ischemic stroke vs. drug-induced; mechanism: pending workup    Imagin/02: MRI Brain: Moderate pontine and minimal anterior periventricular white matter ischemia.  Mild mucosal thickening in the BILATERAL maxillary sinuses.  : CT PERFUSION: No core infarct or penumbra of ischemic tissue is identified by CT perfusion.  CT ANGIOGRAPHY NECK: Cervical vasculature is patent without evidence of stenosis or dissection.  CT ANGIOGRAPHY BRAIN: 1. No vessel occlusion, stenosis or aneurysm is identified about the [place].  Normal anatomic variants as discussed above. 2. No evidence of dural venous sinus thrombosis or an arteriovenous   malformation.    Passed dysphagia screen  @03:04. NIHSS on admission: 1 (+1 LUE ataxia)  LKN: 2024, 20:00  pre-MRS: 0  NO tenecteplase d/t outside therapeutic window.  NO thrombectomy d/t no LVO.    Mental status: Awake, Alert; Oriented to: person, place, and time; Speech: fluent; Repetition and naming: intact; Follows simple and complex commands (but at times struggles with basic instructions); Fund of knowledge: intact    Impression: LUE dysmetria, gait ataxia; c/w left cerebellar dysfunction, d/t acute ischemic stroke vs. drug-induced; mechanism: pending workup    Imagin/02: MRI Brain: Moderate pontine and minimal anterior periventricular white matter ischemia.  Mild mucosal thickening in the BILATERAL maxillary sinuses.  : CT PERFUSION: No core infarct or penumbra of ischemic tissue is identified by CT perfusion.  CT ANGIOGRAPHY NECK: Cervical vasculature is patent without evidence of stenosis or dissection.  CT ANGIOGRAPHY BRAIN: 1. No vessel occlusion, stenosis or aneurysm is identified about the [place].  Normal anatomic variants as discussed above. 2. No evidence of dural venous sinus thrombosis or an arteriovenous malformation.    Passed dysphagia screen  @03:04.    *Not previously known to this service.

## 2024-04-02 NOTE — H&P ADULT - ASSESSMENT
Wen Larios is a 63-year-old RIGHT handed woman, with PMH significant for T2DM (on metformin), renal stones, bipolar disorder (with previous suicide attempt), depression, panic attack, who presented to [facility] ED on 4/2/2024, with c/o gait instability. Noted to have LUE dysmetria on FTN testing and was unable to walk unassisted. Of note, patient's son did mention that patient had a similar episode of gait instability after overdosing on clonazepam last year. He notes she has been confused recently, which he attributes to her benzodiazepine use.    ED vitals notable for: afebrile, HR 67, /92, RR 17, SpO2 96% on RA. Labs notable for: glucose 154. CT imaging was relatively unremarkable. Exam notable for LUE ataxia and inability to walk unassisted. Patient is also noted to struggle at times with basic instructions.    NIHSS on admission: 1 (+1 LUE ataxia)  LKN: 4/1/2024, 20:00  pre-MRS: 0    Impression: LUE dysmetria, gait ataxia; c/w left cerebellar dysfunction, d/t acute ischemic stroke vs. drug-induced; mechanism: pending workup    Plan:  [] Admit to Stroke Floor  [] Telemetry monitoring  [] Dysphagia screen  [] SBP goal <220  [] Start aspirin 81mg  [] Start atorvastatin 80mg (goal LDL<70)  [] Continue home medications, but hold home metformin (use LDSSI) and home clonazepam 0.5mg qHS (monitor for withdrawal symptoms)  [] A1c, lipid panel, TSH, vitamin B12/D, UTox, UA  [] MRI brain w/o contrast  [] TTE with bubble study  [] ILR placement pending workup  [] Neurochecks, vitals q4h  [] Fall and aspiration precautions  [] PT/OT/SLP/CM  [] Diet: target is DASH/TLC with consistent carbohydrate  [] DVT prophylaxis: enoxaparin 40mg q24h AND SCDs  [] Stroke education provided  [] Smoking cessation education not needed: non-smoker  [] Please document MRS on discharge    NO tenecteplase d/t outside therapeutic window.  NO thrombectomy d/t no LVO.    Patient/plan d/w Stroke fellow, Dr. Wang Muniz, under the supervision of attending vascular neurologist Dr. Toribio. To be seen by attending in the AM with attestation to follow. Orders placed promptly on admission. Note delayed d/t emergent patient care.

## 2024-04-02 NOTE — PHYSICAL THERAPY INITIAL EVALUATION ADULT - PLANNED THERAPY INTERVENTIONS, PT EVAL
stair negotiation GOAL: Patient will negotiate up / down 6 steps independently in 2 weeks/gait training/transfer training

## 2024-04-02 NOTE — DISCHARGE NOTE PROVIDER - HOSPITAL COURSE
63-year-old RIGHT handed woman, with PMH significant for T2DM (on metformin), renal stones, bipolar disorder (with previous suicide attempt), depression, panic attack, who presented to [facility] ED on 4/2/2024, with c/o gait instability. Noted to have LUE dysmetria on FTN testing and was unable to walk unassisted. Of note, patient's son did mention that patient had a similar episode of gait instability last year which was attributed to high dose of clonazepam. She notes she has been confused recently, which he attributes to her benzodiazepine use.    NIHSS on admission: 1 (+1 LUE ataxia)  LKN: 4/1/2024, 20:00  pre-MRS: 0    Impression: LUE dysmetria, gait ataxia; c/w left cerebellar dysfunction, d/t acute ischemic stroke vs. drug-induced; mechanism:     Patients symptoms all since resolved. Admitted to stroke service. Started on Aspirin and Atorvastatin.     MR Head No Cont (04.02.24 @ 09:16)   Moderate pontine and minimal anterior periventricular white   matter ischemia.  Mild mucosal thickening in the BILATERAL maxillary   sinuses.    Patients symptoms all improved by 4/2/24. No neuro deficits on exam.   Follow up with Primary Care Physician within the next

## 2024-04-02 NOTE — DISCHARGE NOTE NURSING/CASE MANAGEMENT/SOCIAL WORK - PATIENT PORTAL LINK FT
You can access the FollowMyHealth Patient Portal offered by [facility] by registering at the following website: [email or web address]. By joining [facility]’s FollowMyHealth portal, you will also be able to view your health information using other applications (apps) compatible with our system.

## 2024-04-02 NOTE — ED ADULT NURSE NOTE - NEURO GAIT
5-Fu Pregnancy And Lactation Text: This medication is Pregnancy Category X and contraindicated in pregnancy and in women who may become pregnant. It is unknown if this medication is excreted in breast milk. unsteady

## 2024-04-02 NOTE — OCCUPATIONAL THERAPY INITIAL EVALUATION ADULT - LIVES WITH, PROFILE
Pt lives with son in [place]. Pt reports independence with all aspects of self care and functional mobility without AD PTA.

## 2024-04-02 NOTE — H&P ADULT - NSHPADDITIONALINFOADULT_GEN_ALL_CORE
Wen Larios is a 63-year-old RIGHT handed woman with PMH significant for T2DM (on metformin), complex psy issues (bipolar disorder, prior suicide attempt, depression, panic attack), here at ED on 4/1 for acute onset of unsteady gait, cannot walk with NIHSS1 for LUE dysmetria but no other ataxia of trunk or legs. No TNK and MT indicated. Was kept overnight for MRI and started with ASA 81mg. MRI was negative for persistent hours-long sxm, concerning psy-med related LUE dysmetria. On rounding 4/2 AM, pt can walk with ease to bathroom, witnessed and cleared by PT for home. DC ASA as no evidence of stroke. Place on Lipitor 40 due to . A1C 6.1 (may need PCP followup for optimization of stroke risk factor).     Pt was deemed medical stable to go home. Pt agreed.     Wang Muniz MD PhD  Vascular neurology fellow

## 2024-04-02 NOTE — ED ADULT NURSE NOTE - OBJECTIVE STATEMENT
63y F, presenting with bilateral leg weakness, causing impaired gait. LNW ~8pm on 4/1/24. Code stroke called at 2001, neuro at bedside at 2002, pt at ct scan 2005. during exam, it should be noted that patient had difficulty answering questions and following instructions by MD - will follow up with kylie aguirre ast bedside regarding patient baseline MS. weight 63.9kg .

## 2024-04-02 NOTE — SPEECH LANGUAGE PATHOLOGY EVALUATION - SLP DIAGNOSIS
Chart reviewed. Consult received and appreciated. Attempted to see pt for speech and language evaluation; however, as per d/w CEDRIC Mcgovern, discharge note is in medical chart, w/ pt pending discharge. Discussed with neuro resident Dr. Hubbard, who stated evaluation is not warranted at this time. This service will therefore sign off.

## 2024-04-02 NOTE — H&P ADULT - ATTENDING COMMENTS
I reviewed available diagnostic studies, and reviewed images personally. I agree with resident & fellow 's history, exam, orders placed, and plan of care. LUE with slight tremor but reaches target, less "ataxic" appearing but more tremor at this time. MRI (-). OK for dc home, recommended getting in touch with psych to readjust medications also given the sensation of imbalance. Worked with PT, witnessed to be walking stable.

## 2024-04-02 NOTE — OCCUPATIONAL THERAPY INITIAL EVALUATION ADULT - PERTINENT HX OF CURRENT PROBLEM, REHAB EVAL
63F with PMH significant for T2DM (on metformin), renal stones, bipolar disorder (with previous suicide attempt), depression, panic attack, who presented to [facility] ED on 4/2/2024, with c/o gait instability. Patient is accompanied by her son. Patient is frequently inconsistent with her story - constantly changing it.  Patient reports that at approximately 20:00,  she got up to go use the bathroom.  She says she could not keep herself balanced and needed to lean on the wall for support.  Patient says she made it back to the bed and called her son, who brought her to the emergency room.  Patient reports that she took her normal bedtime medications which include lamotrigine 200mg, clonazepam 0.5mg, trazodone 50mg.  She denies any new medications.  She denies that this has happened before.  She denies a history of stroke.  She denies a history of MI.  Patient is not on any blood thinners or aspirin.  Patient does not use any assistive devices at baseline.  She is able to climb stairs and she drives.  Patient denies a history of tobacco use.  Patient reports occasional alcohol use.  Patient denies history of recreational drug use.  Patient lives with her son.  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 Patient noted by ED provider to be ataxic in her ED room. Patient was noted to have left upper extremity dysmetria. Therefore, a CODE STROKE was called.  Patient was very difficult to redirect during exam and had trouble following instructions. Could not walk to scale at CT. CT imaging was unremarkable. Of note, patient's son tells me that patient took too much clonazepam last year and experienced gait instability similar to today. She also mentions that patient had a bad fall last year. Says patient is frequently confused and he believes it may be due to her taking too much benzodiazepines.  \  IMAGING: CT PERFUSION: No core infarct or penumbra of ischemic tissue is identified by CT perfusion. CT ANGIOGRAPHY NECK: Cervical vasculature is patent without evidence of stenosis or dissection. CT ANGIOGRAPHY BRAIN: No vessel occlusion, stenosis or aneurysm is identified about the [place].  Normal anatomic variants as discussed above. No evidence of dural venous sinus thrombosis or an arteriovenous malformation.

## 2024-04-02 NOTE — H&P ADULT - NSHPPHYSICALEXAM_GEN_ALL_CORE
Vitals:  T(C): 36.3 (04-02-24 @ 04:00), Max: 36.6 (04-02-24 @ 00:46)  HR: 62 (04-02-24 @ 04:00) (62 - 67)  BP: 122/76 (04-02-24 @ 04:00) (122/76 - 142/92)  RR: 14 (04-02-24 @ 04:00) (14 - 21)  SpO2: 96% (04-02-24 @ 04:00) (94% - 96%)    Physical Examination:  General - Sitting up on ED cart, in NAD, appears stated age, relatively well-appearing; she is difficult at times to re-direct during examination and she does struggle with basic instructions  Cardiovascular - No LE edema  Eyes - Non-injected conjunctivae, anicteric sclerae    Neurologic Exam:  Mental status:  Awake, Alert; Oriented to: person, place, and time; Speech: fluent; Repetition and naming: intact; Follows simple and complex commands (but at times struggles with basic instructions); Fund of knowledge: intact    Cranial nerves - PERRL, VF are grossly intact but with poor fixation, EOMI, face sensation (V1-V3) intact b/l, facial strength intact without asymmetry b/l, hearing intact b/l with finger rub test, palate with symmetric elevation, shoulder shrug intact b/l, tongue midline on protrusion with full lateral movement  Dysarthria: not present    Motor - Normal bulk and tone throughout. No pronator drift.  Strength testing (R/L)  Deltoid:  5/5   Biceps:  5/5   Triceps:  5/5   Wrist Extension:  5/5   Wrist Flexion:  5/5   Interossei:  5/5   :  5/5  Hip Flexion:  5/5   Hip Extension:  5/5   Knee Flexion:  5/5   Knee Extension:  5/5   Dorsiflexion:  5/5   Plantar Flexion:  5/5    FFM intact. Not orbiting with forearm rolling.    Sensation - Light touch/vibration intact throughout    DTRs (R/L)  Deferred d/t focused neurologic exam.    Coordination - LUE dysmetria. FTN intact RUE. HTS intact b/l. No tremors appreciated.    Gait and station - Unable to walk at CT scanner to scale - both legs quickly buckled when she attempted to ambulate.

## 2024-04-02 NOTE — DISCHARGE NOTE PROVIDER - NSDCMRMEDTOKEN_GEN_ALL_CORE_FT
atorvastatin 40 mg oral tablet: 1 tab(s) orally once a day (at bedtime)  clonazePAM 0.5 mg oral tablet: 1 tab(s) orally once a day (at bedtime)  lamoTRIgine 100 mg oral tablet: 1.5 tab(s) orally once a day (at bedtime)  Lexapro 10 mg oral tablet: 1 orally once a day  metFORMIN 1000 mg oral tablet: 1 tab(s) orally 2 times a day  traZODone 50 mg oral tablet: 1 tab(s) orally once a day (at bedtime)

## 2024-04-02 NOTE — SPEECH LANGUAGE PATHOLOGY EVALUATION - SLP PERTINENT HISTORY OF CURRENT PROBLEM
63-year-old RIGHT handed woman, w/ PMH significant for T2DM (on metformin), renal stones, bipolar disorder (with previous suicide attempt), depression, panic attack, who presented to [facility] ED on 4/2/2024, with c/o gait instability. Noted to have LUE dysmetria on FTN testing and was unable to walk unassisted. Of note, patient's son did mention that patient had a similar episode of gait instability after overdosing on clonazepam last year. He notes she has been confused recently, which he attributes to her benzodiazepine use. CT imaging was relatively unremarkable. Exam notable for LUE ataxia and inability to walk unassisted. Patient is also noted to struggle at times with basic instructions.

## 2024-06-16 NOTE — PATIENT PROFILE ADULT - HAVE YOU RECENTLY LOST WEIGHT WITHOUT TRYING?
Spiritual Care Visit    Clinical Encounter Type  Visited With: Patient not available, Health care provider                                                    No (0)

## 2025-02-07 NOTE — ED ADULT NURSE NOTE - NS_ED_NURSE_RECEIVING_FACILITY _ED_ALL_ED
Patient tolerated her treatment without any adverse reactions. Next appointment confirmed 2/13/25 at 1300 at [place]. Patient declined avs   [facility]

## (undated) DEVICE — SOL IRR POUR H2O 1500ML

## (undated) DEVICE — DRAPE 3/4 SHEET 52X76"

## (undated) DEVICE — POSITIONER FOAM EGG CRATE ULNAR 2PCS (PINK)

## (undated) DEVICE — ELCTR GROUNDING PAD ADULT COVIDIEN

## (undated) DEVICE — DRAPE TOWEL BLUE 17" X 24"

## (undated) DEVICE — DRAIN JACKSON PRATT 7MM FLAT FULL NO TROCAR

## (undated) DEVICE — VENODYNE/SCD SLEEVE CALF MEDIUM

## (undated) DEVICE — PROTECTOR HEEL / ELBOW FLUFFY

## (undated) DEVICE — LAP PAD W RING 18 X 18"

## (undated) DEVICE — GLV 7 PROTEXIS (WHITE)

## (undated) DEVICE — STAPLER SKIN VISI-STAT 35 WIDE

## (undated) DEVICE — SUT VICRYL 2-0 27" SH UNDYED

## (undated) DEVICE — SUT SILK 3-0 18" TIES

## (undated) DEVICE — DRSG BENZOIN 0.6CC

## (undated) DEVICE — CANISTER DISPOSABLE THIN WALL 3000CC

## (undated) DEVICE — SUT VICRYL 4-0 27" RB-1 UNDYED

## (undated) DEVICE — DRAPE MAGNETIC INSTRUMENT MEDIUM

## (undated) DEVICE — VISITEC 4X4

## (undated) DEVICE — SYR LUER LOK 5CC

## (undated) DEVICE — PREP BETADINE SPONGE STICKS

## (undated) DEVICE — DRSG STERISTRIPS 0.25 X 4"

## (undated) DEVICE — DRSG STERISTRIPS 0.25 X 3"

## (undated) DEVICE — ELCTR BOVIE PENCIL SMOKE EVACUATION

## (undated) DEVICE — LABELS BLANK W PEN

## (undated) DEVICE — SUT ETHILON 6-0 18" PS-3

## (undated) DEVICE — DRAPE SPLIT SHEET 77" X 120"

## (undated) DEVICE — WARMING BLANKET FULL ADULT

## (undated) DEVICE — DRAIN RESERVOIR FOR JACKSON PRATT 100CC CARDINAL

## (undated) DEVICE — SUT SILK 2-0 18" FS

## (undated) DEVICE — SUT SILK 2-0 18" TIES

## (undated) DEVICE — DRAIN JACKSON PRATT 7FR ROUND END NO TROCAR

## (undated) DEVICE — PACK HEAD & NECK